# Patient Record
Sex: FEMALE | Race: WHITE | NOT HISPANIC OR LATINO | Employment: OTHER | URBAN - METROPOLITAN AREA
[De-identification: names, ages, dates, MRNs, and addresses within clinical notes are randomized per-mention and may not be internally consistent; named-entity substitution may affect disease eponyms.]

---

## 2017-01-25 ENCOUNTER — ALLSCRIPTS OFFICE VISIT (OUTPATIENT)
Dept: OTHER | Facility: OTHER | Age: 52
End: 2017-01-25

## 2017-01-25 DIAGNOSIS — K80.10 CALCULUS OF GALLBLADDER WITH CHRONIC CHOLECYSTITIS WITHOUT OBSTRUCTION: ICD-10-CM

## 2017-01-25 DIAGNOSIS — E03.9 HYPOTHYROIDISM: ICD-10-CM

## 2017-01-31 RX ORDER — OXYCODONE AND ACETAMINOPHEN 10; 325 MG/1; MG/1
1 TABLET ORAL EVERY 4 HOURS PRN
COMMUNITY
End: 2020-03-25

## 2017-01-31 RX ORDER — BUPROPION HYDROCHLORIDE 300 MG/1
300 TABLET ORAL DAILY
COMMUNITY
End: 2017-07-30

## 2017-01-31 RX ORDER — IBUPROFEN 800 MG/1
800 TABLET ORAL EVERY 6 HOURS PRN
COMMUNITY
End: 2017-07-30

## 2017-01-31 RX ORDER — LISINOPRIL 10 MG/1
10 TABLET ORAL DAILY
COMMUNITY
End: 2018-07-10 | Stop reason: SDUPTHER

## 2017-01-31 RX ORDER — LEVOTHYROXINE SODIUM 0.07 MG/1
75 TABLET ORAL DAILY
COMMUNITY
End: 2018-07-10 | Stop reason: SDUPTHER

## 2017-02-01 ENCOUNTER — ANESTHESIA EVENT (OUTPATIENT)
Dept: PERIOP | Facility: HOSPITAL | Age: 52
End: 2017-02-01
Payer: MEDICARE

## 2017-02-01 ENCOUNTER — ANESTHESIA (OUTPATIENT)
Dept: PERIOP | Facility: HOSPITAL | Age: 52
End: 2017-02-01
Payer: MEDICARE

## 2017-02-01 ENCOUNTER — HOSPITAL ENCOUNTER (OUTPATIENT)
Facility: HOSPITAL | Age: 52
Setting detail: OUTPATIENT SURGERY
Discharge: HOME | End: 2017-02-01
Attending: INTERNAL MEDICINE | Admitting: INTERNAL MEDICINE
Payer: MEDICARE

## 2017-02-01 ENCOUNTER — GENERIC CONVERSION - ENCOUNTER (OUTPATIENT)
Dept: OTHER | Facility: OTHER | Age: 52
End: 2017-02-01

## 2017-02-01 VITALS
TEMPERATURE: 98 F | OXYGEN SATURATION: 98 % | DIASTOLIC BLOOD PRESSURE: 78 MMHG | HEART RATE: 73 BPM | WEIGHT: 190 LBS | RESPIRATION RATE: 18 BRPM | SYSTOLIC BLOOD PRESSURE: 117 MMHG | HEIGHT: 65 IN | BODY MASS INDEX: 31.65 KG/M2

## 2017-02-01 DIAGNOSIS — R10.13 EPIGASTRIC PAIN: ICD-10-CM

## 2017-02-01 DIAGNOSIS — Z12.11 ENCOUNTER FOR SCREENING FOR MALIGNANT NEOPLASM OF COLON: ICD-10-CM

## 2017-02-01 PROCEDURE — 88305 TISSUE EXAM BY PATHOLOGIST: CPT | Performed by: INTERNAL MEDICINE

## 2017-02-01 RX ORDER — LIDOCAINE HYDROCHLORIDE 10 MG/ML
INJECTION, SOLUTION INFILTRATION; PERINEURAL AS NEEDED
Status: DISCONTINUED | OUTPATIENT
Start: 2017-02-01 | End: 2017-02-01 | Stop reason: SURG

## 2017-02-01 RX ORDER — ALPRAZOLAM 1 MG/1
1 TABLET ORAL AS NEEDED
COMMUNITY
End: 2022-02-16

## 2017-02-01 RX ORDER — PROPOFOL 10 MG/ML
INJECTION, EMULSION INTRAVENOUS AS NEEDED
Status: DISCONTINUED | OUTPATIENT
Start: 2017-02-01 | End: 2017-02-01 | Stop reason: SURG

## 2017-02-01 RX ORDER — SODIUM CHLORIDE, SODIUM LACTATE, POTASSIUM CHLORIDE, CALCIUM CHLORIDE 600; 310; 30; 20 MG/100ML; MG/100ML; MG/100ML; MG/100ML
50 INJECTION, SOLUTION INTRAVENOUS CONTINUOUS
Status: DISCONTINUED | OUTPATIENT
Start: 2017-02-01 | End: 2017-02-01 | Stop reason: HOSPADM

## 2017-02-01 RX ADMIN — PROPOFOL 40 MG: 10 INJECTION, EMULSION INTRAVENOUS at 10:25

## 2017-02-01 RX ADMIN — PROPOFOL 150 MG: 10 INJECTION, EMULSION INTRAVENOUS at 10:15

## 2017-02-01 RX ADMIN — SODIUM CHLORIDE, SODIUM LACTATE, POTASSIUM CHLORIDE, AND CALCIUM CHLORIDE: .6; .31; .03; .02 INJECTION, SOLUTION INTRAVENOUS at 10:15

## 2017-02-01 RX ADMIN — LIDOCAINE HYDROCHLORIDE 50 MG: 10 INJECTION, SOLUTION INFILTRATION; PERINEURAL at 10:15

## 2017-02-01 RX ADMIN — PROPOFOL 60 MG: 10 INJECTION, EMULSION INTRAVENOUS at 10:30

## 2017-02-01 RX ADMIN — SODIUM CHLORIDE, SODIUM LACTATE, POTASSIUM CHLORIDE, AND CALCIUM CHLORIDE 50 ML/HR: .6; .31; .03; .02 INJECTION, SOLUTION INTRAVENOUS at 10:01

## 2017-02-01 RX ADMIN — PROPOFOL 50 MG: 10 INJECTION, EMULSION INTRAVENOUS at 10:20

## 2017-02-05 ENCOUNTER — GENERIC CONVERSION - ENCOUNTER (OUTPATIENT)
Dept: OTHER | Facility: OTHER | Age: 52
End: 2017-02-05

## 2017-02-15 ENCOUNTER — HOSPITAL ENCOUNTER (OUTPATIENT)
Dept: ULTRASOUND IMAGING | Facility: HOSPITAL | Age: 52
Discharge: HOME | End: 2017-02-15
Attending: INTERNAL MEDICINE
Payer: MEDICARE

## 2017-02-15 DIAGNOSIS — E03.9 HYPOTHYROIDISM: ICD-10-CM

## 2017-02-15 PROCEDURE — 76705 ECHO EXAM OF ABDOMEN: CPT

## 2017-02-17 ENCOUNTER — GENERIC CONVERSION - ENCOUNTER (OUTPATIENT)
Dept: OTHER | Facility: OTHER | Age: 52
End: 2017-02-17

## 2017-02-21 ENCOUNTER — APPOINTMENT (OUTPATIENT)
Dept: LAB | Facility: HOSPITAL | Age: 52
End: 2017-02-21
Attending: SURGERY
Payer: MEDICARE

## 2017-02-21 ENCOUNTER — TRANSCRIBE ORDERS (OUTPATIENT)
Dept: LAB | Facility: HOSPITAL | Age: 52
End: 2017-02-21

## 2017-02-21 ENCOUNTER — OFFICE VISIT (OUTPATIENT)
Dept: LAB | Facility: HOSPITAL | Age: 52
End: 2017-02-21
Attending: SURGERY
Payer: MEDICARE

## 2017-02-21 ENCOUNTER — ALLSCRIPTS OFFICE VISIT (OUTPATIENT)
Dept: OTHER | Facility: OTHER | Age: 52
End: 2017-02-21

## 2017-02-21 DIAGNOSIS — Z01.818 OTHER SPECIFIED PRE-OPERATIVE EXAMINATION: ICD-10-CM

## 2017-02-21 DIAGNOSIS — R10.13 ABDOMINAL PAIN, EPIGASTRIC: ICD-10-CM

## 2017-02-21 DIAGNOSIS — R10.13 ABDOMINAL PAIN, EPIGASTRIC: Primary | ICD-10-CM

## 2017-02-21 DIAGNOSIS — K80.10 CALCULUS OF GALLBLADDER WITH CHRONIC CHOLECYSTITIS WITHOUT OBSTRUCTION: ICD-10-CM

## 2017-02-21 LAB
ALBUMIN SERPL BCP-MCNC: 3.7 G/DL (ref 3.5–5)
ALP SERPL-CCNC: 100 U/L (ref 46–116)
ALT SERPL W P-5'-P-CCNC: 28 U/L (ref 12–78)
ANION GAP SERPL CALCULATED.3IONS-SCNC: 5 MMOL/L (ref 4–13)
AST SERPL W P-5'-P-CCNC: 18 U/L (ref 5–45)
BASOPHILS # BLD AUTO: 0.05 THOUSANDS/ΜL (ref 0–0.1)
BASOPHILS NFR BLD AUTO: 1 % (ref 0–1)
BILIRUB SERPL-MCNC: 0.6 MG/DL (ref 0.2–1)
BUN SERPL-MCNC: 12 MG/DL (ref 5–25)
CALCIUM SERPL-MCNC: 9.5 MG/DL (ref 8.3–10.1)
CHLORIDE SERPL-SCNC: 107 MMOL/L (ref 100–108)
CO2 SERPL-SCNC: 31 MMOL/L (ref 21–32)
CREAT SERPL-MCNC: 0.91 MG/DL (ref 0.6–1.3)
EOSINOPHIL # BLD AUTO: 0.05 THOUSAND/ΜL (ref 0–0.61)
EOSINOPHIL NFR BLD AUTO: 1 % (ref 0–6)
ERYTHROCYTE [DISTWIDTH] IN BLOOD BY AUTOMATED COUNT: 14 % (ref 11.6–15.1)
GFR SERPL CREATININE-BSD FRML MDRD: >60 ML/MIN/1.73SQ M
GLUCOSE SERPL-MCNC: 88 MG/DL (ref 65–140)
HCT VFR BLD AUTO: 37.7 % (ref 34.8–46.1)
HGB BLD-MCNC: 11.7 G/DL (ref 11.5–15.4)
LYMPHOCYTES # BLD AUTO: 1.59 THOUSANDS/ΜL (ref 0.6–4.47)
LYMPHOCYTES NFR BLD AUTO: 32 % (ref 14–44)
MCH RBC QN AUTO: 26.4 PG (ref 26.8–34.3)
MCHC RBC AUTO-ENTMCNC: 31 G/DL (ref 31.4–37.4)
MCV RBC AUTO: 85 FL (ref 82–98)
MONOCYTES # BLD AUTO: 0.47 THOUSAND/ΜL (ref 0.17–1.22)
MONOCYTES NFR BLD AUTO: 9 % (ref 4–12)
NEUTROPHILS # BLD AUTO: 2.87 THOUSANDS/ΜL (ref 1.85–7.62)
NEUTS SEG NFR BLD AUTO: 57 % (ref 43–75)
NRBC BLD AUTO-RTO: 0 /100 WBCS
PLATELET # BLD AUTO: 205 THOUSANDS/UL (ref 149–390)
PMV BLD AUTO: 9.9 FL (ref 8.9–12.7)
POTASSIUM SERPL-SCNC: 4.3 MMOL/L (ref 3.5–5.3)
PROT SERPL-MCNC: 7.1 G/DL (ref 6.4–8.2)
RBC # BLD AUTO: 4.43 MILLION/UL (ref 3.81–5.12)
SODIUM SERPL-SCNC: 143 MMOL/L (ref 136–145)
WBC # BLD AUTO: 5.04 THOUSAND/UL (ref 4.31–10.16)

## 2017-02-21 PROCEDURE — 85025 COMPLETE CBC W/AUTO DIFF WBC: CPT

## 2017-02-21 PROCEDURE — 36415 COLL VENOUS BLD VENIPUNCTURE: CPT

## 2017-02-21 PROCEDURE — 80053 COMPREHEN METABOLIC PANEL: CPT

## 2017-02-21 PROCEDURE — 93005 ELECTROCARDIOGRAM TRACING: CPT

## 2017-02-22 LAB
ATRIAL RATE: 61 BPM
P AXIS: 32 DEGREES
PR INTERVAL: 144 MS
QRS AXIS: 21 DEGREES
QRSD INTERVAL: 76 MS
QT INTERVAL: 434 MS
QTC INTERVAL: 436 MS
T WAVE AXIS: 28 DEGREES
VENTRICULAR RATE: 61 BPM

## 2017-02-24 ENCOUNTER — ANESTHESIA (OUTPATIENT)
Dept: PERIOP | Facility: HOSPITAL | Age: 52
End: 2017-02-24
Payer: MEDICARE

## 2017-02-24 ENCOUNTER — APPOINTMENT (OUTPATIENT)
Dept: RADIOLOGY | Facility: HOSPITAL | Age: 52
End: 2017-02-24
Payer: MEDICARE

## 2017-02-24 ENCOUNTER — HOSPITAL ENCOUNTER (OUTPATIENT)
Facility: HOSPITAL | Age: 52
Setting detail: OUTPATIENT SURGERY
Discharge: HOME | End: 2017-02-24
Attending: SURGERY | Admitting: SURGERY
Payer: MEDICARE

## 2017-02-24 ENCOUNTER — ANESTHESIA EVENT (OUTPATIENT)
Dept: PERIOP | Facility: HOSPITAL | Age: 52
End: 2017-02-24
Payer: MEDICARE

## 2017-02-24 VITALS
BODY MASS INDEX: 31.65 KG/M2 | DIASTOLIC BLOOD PRESSURE: 70 MMHG | RESPIRATION RATE: 18 BRPM | SYSTOLIC BLOOD PRESSURE: 107 MMHG | HEART RATE: 84 BPM | WEIGHT: 190 LBS | TEMPERATURE: 98.2 F | HEIGHT: 65 IN | OXYGEN SATURATION: 95 %

## 2017-02-24 DIAGNOSIS — K80.10 CALCULUS OF GALLBLADDER WITH CHRONIC CHOLECYSTITIS WITHOUT OBSTRUCTION: ICD-10-CM

## 2017-02-24 PROCEDURE — 88304 TISSUE EXAM BY PATHOLOGIST: CPT | Performed by: SURGERY

## 2017-02-24 RX ORDER — BUPIVACAINE HYDROCHLORIDE 2.5 MG/ML
INJECTION, SOLUTION EPIDURAL; INFILTRATION; INTRACAUDAL AS NEEDED
Status: DISCONTINUED | OUTPATIENT
Start: 2017-02-24 | End: 2017-02-24 | Stop reason: HOSPADM

## 2017-02-24 RX ORDER — PROMETHAZINE HYDROCHLORIDE 25 MG/ML
25 INJECTION, SOLUTION INTRAMUSCULAR; INTRAVENOUS ONCE AS NEEDED
Status: COMPLETED | OUTPATIENT
Start: 2017-02-24 | End: 2017-02-24

## 2017-02-24 RX ORDER — LIDOCAINE HYDROCHLORIDE 10 MG/ML
INJECTION, SOLUTION INFILTRATION; PERINEURAL AS NEEDED
Status: DISCONTINUED | OUTPATIENT
Start: 2017-02-24 | End: 2017-02-24 | Stop reason: SURG

## 2017-02-24 RX ORDER — ONDANSETRON 2 MG/ML
4 INJECTION INTRAMUSCULAR; INTRAVENOUS ONCE
Status: DISCONTINUED | OUTPATIENT
Start: 2017-02-24 | End: 2017-02-24 | Stop reason: HOSPADM

## 2017-02-24 RX ORDER — OXYCODONE HYDROCHLORIDE AND ACETAMINOPHEN 5; 325 MG/1; MG/1
1 TABLET ORAL EVERY 4 HOURS PRN
Qty: 30 TABLET | Refills: 0 | Status: SHIPPED | OUTPATIENT
Start: 2017-02-24 | End: 2017-03-06

## 2017-02-24 RX ORDER — ROCURONIUM BROMIDE 10 MG/ML
INJECTION, SOLUTION INTRAVENOUS AS NEEDED
Status: DISCONTINUED | OUTPATIENT
Start: 2017-02-24 | End: 2017-02-24 | Stop reason: SURG

## 2017-02-24 RX ORDER — OXYCODONE HYDROCHLORIDE AND ACETAMINOPHEN 5; 325 MG/1; MG/1
1 TABLET ORAL EVERY 4 HOURS PRN
Status: DISCONTINUED | OUTPATIENT
Start: 2017-02-24 | End: 2017-02-24 | Stop reason: HOSPADM

## 2017-02-24 RX ORDER — METOCLOPRAMIDE HYDROCHLORIDE 5 MG/ML
INJECTION INTRAMUSCULAR; INTRAVENOUS AS NEEDED
Status: DISCONTINUED | OUTPATIENT
Start: 2017-02-24 | End: 2017-02-24 | Stop reason: SURG

## 2017-02-24 RX ORDER — PROPOFOL 10 MG/ML
INJECTION, EMULSION INTRAVENOUS AS NEEDED
Status: DISCONTINUED | OUTPATIENT
Start: 2017-02-24 | End: 2017-02-24 | Stop reason: SURG

## 2017-02-24 RX ORDER — FENTANYL CITRATE/PF 50 MCG/ML
50 SYRINGE (ML) INJECTION
Status: DISCONTINUED | OUTPATIENT
Start: 2017-02-24 | End: 2017-02-24 | Stop reason: HOSPADM

## 2017-02-24 RX ORDER — MORPHINE SULFATE 2 MG/ML
2 INJECTION, SOLUTION INTRAMUSCULAR; INTRAVENOUS EVERY 2 HOUR PRN
Status: DISCONTINUED | OUTPATIENT
Start: 2017-02-24 | End: 2017-02-24 | Stop reason: HOSPADM

## 2017-02-24 RX ORDER — MEPERIDINE HYDROCHLORIDE 25 MG/ML
25 INJECTION INTRAMUSCULAR; INTRAVENOUS; SUBCUTANEOUS AS NEEDED
Status: DISCONTINUED | OUTPATIENT
Start: 2017-02-24 | End: 2017-02-24 | Stop reason: HOSPADM

## 2017-02-24 RX ORDER — MIDAZOLAM HYDROCHLORIDE 1 MG/ML
INJECTION INTRAMUSCULAR; INTRAVENOUS AS NEEDED
Status: DISCONTINUED | OUTPATIENT
Start: 2017-02-24 | End: 2017-02-24 | Stop reason: SURG

## 2017-02-24 RX ORDER — FENTANYL CITRATE 50 UG/ML
INJECTION, SOLUTION INTRAMUSCULAR; INTRAVENOUS AS NEEDED
Status: DISCONTINUED | OUTPATIENT
Start: 2017-02-24 | End: 2017-02-24 | Stop reason: SURG

## 2017-02-24 RX ORDER — ONDANSETRON 2 MG/ML
INJECTION INTRAMUSCULAR; INTRAVENOUS AS NEEDED
Status: DISCONTINUED | OUTPATIENT
Start: 2017-02-24 | End: 2017-02-24 | Stop reason: SURG

## 2017-02-24 RX ORDER — MAGNESIUM HYDROXIDE 1200 MG/15ML
LIQUID ORAL AS NEEDED
Status: DISCONTINUED | OUTPATIENT
Start: 2017-02-24 | End: 2017-02-24 | Stop reason: HOSPADM

## 2017-02-24 RX ORDER — SODIUM CHLORIDE, SODIUM LACTATE, POTASSIUM CHLORIDE, CALCIUM CHLORIDE 600; 310; 30; 20 MG/100ML; MG/100ML; MG/100ML; MG/100ML
100 INJECTION, SOLUTION INTRAVENOUS CONTINUOUS
Status: DISCONTINUED | OUTPATIENT
Start: 2017-02-24 | End: 2017-02-24 | Stop reason: HOSPADM

## 2017-02-24 RX ORDER — ONDANSETRON 2 MG/ML
4 INJECTION INTRAMUSCULAR; INTRAVENOUS ONCE AS NEEDED
Status: DISCONTINUED | OUTPATIENT
Start: 2017-02-24 | End: 2017-02-24 | Stop reason: HOSPADM

## 2017-02-24 RX ORDER — GLYCOPYRROLATE 0.2 MG/ML
INJECTION INTRAMUSCULAR; INTRAVENOUS AS NEEDED
Status: DISCONTINUED | OUTPATIENT
Start: 2017-02-24 | End: 2017-02-24 | Stop reason: SURG

## 2017-02-24 RX ADMIN — OXYCODONE HYDROCHLORIDE AND ACETAMINOPHEN 1 TABLET: 5; 325 TABLET ORAL at 13:58

## 2017-02-24 RX ADMIN — FENTANYL CITRATE 50 MCG: 50 INJECTION, SOLUTION INTRAMUSCULAR; INTRAVENOUS at 11:41

## 2017-02-24 RX ADMIN — DEXAMETHASONE SODIUM PHOSPHATE 10 MG: 10 INJECTION INTRAMUSCULAR; INTRAVENOUS at 12:01

## 2017-02-24 RX ADMIN — HYDROMORPHONE HYDROCHLORIDE 0.5 MG: 1 INJECTION, SOLUTION INTRAMUSCULAR; INTRAVENOUS; SUBCUTANEOUS at 12:11

## 2017-02-24 RX ADMIN — ROCURONIUM BROMIDE 40 MG: 10 INJECTION, SOLUTION INTRAVENOUS at 11:29

## 2017-02-24 RX ADMIN — LIDOCAINE HYDROCHLORIDE 50 MG: 10 INJECTION, SOLUTION INFILTRATION; PERINEURAL at 11:29

## 2017-02-24 RX ADMIN — METOCLOPRAMIDE HYDROCHLORIDE 10 MG: 5 INJECTION INTRAMUSCULAR; INTRAVENOUS at 12:17

## 2017-02-24 RX ADMIN — SODIUM CHLORIDE, SODIUM LACTATE, POTASSIUM CHLORIDE, AND CALCIUM CHLORIDE 100 ML/HR: .6; .31; .03; .02 INJECTION, SOLUTION INTRAVENOUS at 10:01

## 2017-02-24 RX ADMIN — ONDANSETRON 4 MG: 2 INJECTION INTRAMUSCULAR; INTRAVENOUS at 11:27

## 2017-02-24 RX ADMIN — SODIUM CHLORIDE, SODIUM LACTATE, POTASSIUM CHLORIDE, AND CALCIUM CHLORIDE: .6; .31; .03; .02 INJECTION, SOLUTION INTRAVENOUS at 12:19

## 2017-02-24 RX ADMIN — GLYCOPYRROLATE 0.1 MG: 0.2 INJECTION, SOLUTION INTRAMUSCULAR; INTRAVENOUS at 11:27

## 2017-02-24 RX ADMIN — MIDAZOLAM HYDROCHLORIDE 2 MG: 1 INJECTION, SOLUTION INTRAMUSCULAR; INTRAVENOUS at 11:20

## 2017-02-24 RX ADMIN — NEOSTIGMINE METHYLSULFATE 4 MG: 1 INJECTION INTRAMUSCULAR; INTRAVENOUS; SUBCUTANEOUS at 12:09

## 2017-02-24 RX ADMIN — PROPOFOL 200 MG: 10 INJECTION, EMULSION INTRAVENOUS at 11:29

## 2017-02-24 RX ADMIN — PROMETHAZINE HYDROCHLORIDE 25 MG: 25 INJECTION INTRAMUSCULAR; INTRAVENOUS at 12:47

## 2017-02-24 RX ADMIN — FENTANYL CITRATE 50 MCG: 50 INJECTION, SOLUTION INTRAMUSCULAR; INTRAVENOUS at 11:29

## 2017-02-24 RX ADMIN — FENTANYL CITRATE 50 MCG: 50 INJECTION INTRAMUSCULAR; INTRAVENOUS at 12:45

## 2017-02-24 RX ADMIN — CEFAZOLIN SODIUM 2000 MG: 2 SOLUTION INTRAVENOUS at 11:35

## 2017-02-24 RX ADMIN — GLYCOPYRROLATE 0.4 MG: 0.2 INJECTION, SOLUTION INTRAMUSCULAR; INTRAVENOUS at 12:09

## 2017-02-24 RX ADMIN — FENTANYL CITRATE 50 MCG: 50 INJECTION INTRAMUSCULAR; INTRAVENOUS at 12:35

## 2017-03-07 ENCOUNTER — ALLSCRIPTS OFFICE VISIT (OUTPATIENT)
Dept: OTHER | Facility: OTHER | Age: 52
End: 2017-03-07

## 2017-07-30 ENCOUNTER — APPOINTMENT (EMERGENCY)
Dept: CT IMAGING | Facility: HOSPITAL | Age: 52
DRG: 438 | End: 2017-07-30
Payer: COMMERCIAL

## 2017-07-30 ENCOUNTER — APPOINTMENT (EMERGENCY)
Dept: RADIOLOGY | Facility: HOSPITAL | Age: 52
DRG: 438 | End: 2017-07-30
Payer: COMMERCIAL

## 2017-07-30 ENCOUNTER — HOSPITAL ENCOUNTER (INPATIENT)
Facility: HOSPITAL | Age: 52
LOS: 1 days | Discharge: HOME | DRG: 438 | End: 2017-08-01
Attending: EMERGENCY MEDICINE | Admitting: INTERNAL MEDICINE
Payer: COMMERCIAL

## 2017-07-30 DIAGNOSIS — R10.9 ABDOMINAL PAIN: ICD-10-CM

## 2017-07-30 DIAGNOSIS — K85.90 PANCREATITIS: Primary | ICD-10-CM

## 2017-07-30 PROBLEM — R07.9 CHEST PAIN: Status: ACTIVE | Noted: 2017-07-30

## 2017-07-30 PROBLEM — R07.1 CHEST PAIN ON BREATHING: Status: ACTIVE | Noted: 2017-07-30

## 2017-07-30 PROBLEM — Z72.0 TOBACCO USE: Status: ACTIVE | Noted: 2017-07-30

## 2017-07-30 PROBLEM — E03.9 HYPOTHYROIDISM: Status: ACTIVE | Noted: 2017-07-30

## 2017-07-30 LAB
ALBUMIN SERPL BCP-MCNC: 3.9 G/DL (ref 3.5–5)
ALP SERPL-CCNC: 101 U/L (ref 46–116)
ALT SERPL W P-5'-P-CCNC: 39 U/L (ref 12–78)
ANION GAP SERPL CALCULATED.3IONS-SCNC: 8 MMOL/L (ref 4–13)
AST SERPL W P-5'-P-CCNC: 29 U/L (ref 5–45)
BACTERIA UR QL AUTO: ABNORMAL /HPF
BASOPHILS # BLD AUTO: 0.06 THOUSANDS/ΜL (ref 0–0.1)
BASOPHILS NFR BLD AUTO: 1 % (ref 0–1)
BILIRUB SERPL-MCNC: 1 MG/DL (ref 0.2–1)
BILIRUB UR QL STRIP: NEGATIVE
BUN SERPL-MCNC: 17 MG/DL (ref 5–25)
CALCIUM SERPL-MCNC: 9.3 MG/DL (ref 8.3–10.1)
CHLORIDE SERPL-SCNC: 105 MMOL/L (ref 100–108)
CLARITY UR: CLEAR
CO2 SERPL-SCNC: 28 MMOL/L (ref 21–32)
COLOR UR: YELLOW
CREAT SERPL-MCNC: 0.97 MG/DL (ref 0.6–1.3)
EOSINOPHIL # BLD AUTO: 0.04 THOUSAND/ΜL (ref 0–0.61)
EOSINOPHIL NFR BLD AUTO: 1 % (ref 0–6)
ERYTHROCYTE [DISTWIDTH] IN BLOOD BY AUTOMATED COUNT: 14.3 % (ref 11.6–15.1)
GFR SERPL CREATININE-BSD FRML MDRD: 68 ML/MIN/1.73SQ M
GLUCOSE SERPL-MCNC: 125 MG/DL (ref 65–140)
GLUCOSE UR STRIP-MCNC: NEGATIVE MG/DL
HCG UR QL: NEGATIVE
HCT VFR BLD AUTO: 37.7 % (ref 34.8–46.1)
HGB BLD-MCNC: 11.6 G/DL (ref 11.5–15.4)
HGB UR QL STRIP.AUTO: ABNORMAL
KETONES UR STRIP-MCNC: ABNORMAL MG/DL
LEUKOCYTE ESTERASE UR QL STRIP: NEGATIVE
LIPASE SERPL-CCNC: 1338 U/L (ref 73–393)
LYMPHOCYTES # BLD AUTO: 1.02 THOUSANDS/ΜL (ref 0.6–4.47)
LYMPHOCYTES NFR BLD AUTO: 17 % (ref 14–44)
MCH RBC QN AUTO: 25.4 PG (ref 26.8–34.3)
MCHC RBC AUTO-ENTMCNC: 30.8 G/DL (ref 31.4–37.4)
MCV RBC AUTO: 83 FL (ref 82–98)
MONOCYTES # BLD AUTO: 0.34 THOUSAND/ΜL (ref 0.17–1.22)
MONOCYTES NFR BLD AUTO: 6 % (ref 4–12)
NEUTROPHILS # BLD AUTO: 4.71 THOUSANDS/ΜL (ref 1.85–7.62)
NEUTS SEG NFR BLD AUTO: 76 % (ref 43–75)
NITRITE UR QL STRIP: NEGATIVE
NON-SQ EPI CELLS URNS QL MICRO: ABNORMAL /HPF
NRBC BLD AUTO-RTO: 0 /100 WBCS
PH UR STRIP.AUTO: 5.5 [PH] (ref 4.5–8)
PLATELET # BLD AUTO: 262 THOUSANDS/UL (ref 149–390)
PMV BLD AUTO: 10.3 FL (ref 8.9–12.7)
POTASSIUM SERPL-SCNC: 3.9 MMOL/L (ref 3.5–5.3)
PROT SERPL-MCNC: 6.8 G/DL (ref 6.4–8.2)
PROT UR STRIP-MCNC: NEGATIVE MG/DL
RBC # BLD AUTO: 4.57 MILLION/UL (ref 3.81–5.12)
RBC #/AREA URNS AUTO: ABNORMAL /HPF
SODIUM SERPL-SCNC: 141 MMOL/L (ref 136–145)
SP GR UR STRIP.AUTO: 1.01 (ref 1–1.03)
TROPONIN I SERPL-MCNC: <0.02 NG/ML
UROBILINOGEN UR QL STRIP.AUTO: 0.2 E.U./DL
WBC # BLD AUTO: 6.18 THOUSAND/UL (ref 4.31–10.16)
WBC #/AREA URNS AUTO: ABNORMAL /HPF

## 2017-07-30 PROCEDURE — 81001 URINALYSIS AUTO W/SCOPE: CPT | Performed by: EMERGENCY MEDICINE

## 2017-07-30 PROCEDURE — 83690 ASSAY OF LIPASE: CPT | Performed by: EMERGENCY MEDICINE

## 2017-07-30 PROCEDURE — 93005 ELECTROCARDIOGRAM TRACING: CPT | Performed by: EMERGENCY MEDICINE

## 2017-07-30 PROCEDURE — 96374 THER/PROPH/DIAG INJ IV PUSH: CPT

## 2017-07-30 PROCEDURE — 84484 ASSAY OF TROPONIN QUANT: CPT | Performed by: EMERGENCY MEDICINE

## 2017-07-30 PROCEDURE — 96375 TX/PRO/DX INJ NEW DRUG ADDON: CPT

## 2017-07-30 PROCEDURE — 74177 CT ABD & PELVIS W/CONTRAST: CPT

## 2017-07-30 PROCEDURE — 80053 COMPREHEN METABOLIC PANEL: CPT | Performed by: EMERGENCY MEDICINE

## 2017-07-30 PROCEDURE — 99285 EMERGENCY DEPT VISIT HI MDM: CPT

## 2017-07-30 PROCEDURE — 81025 URINE PREGNANCY TEST: CPT | Performed by: EMERGENCY MEDICINE

## 2017-07-30 PROCEDURE — 71020 HB CHEST X-RAY 2VW FRONTAL&LATL: CPT

## 2017-07-30 PROCEDURE — 36415 COLL VENOUS BLD VENIPUNCTURE: CPT | Performed by: EMERGENCY MEDICINE

## 2017-07-30 PROCEDURE — 85025 COMPLETE CBC W/AUTO DIFF WBC: CPT | Performed by: EMERGENCY MEDICINE

## 2017-07-30 PROCEDURE — 96361 HYDRATE IV INFUSION ADD-ON: CPT

## 2017-07-30 RX ORDER — SODIUM CHLORIDE 9 MG/ML
125 INJECTION, SOLUTION INTRAVENOUS CONTINUOUS
Status: DISCONTINUED | OUTPATIENT
Start: 2017-07-30 | End: 2017-08-01

## 2017-07-30 RX ORDER — KETOROLAC TROMETHAMINE 30 MG/ML
15 INJECTION, SOLUTION INTRAMUSCULAR; INTRAVENOUS ONCE
Status: COMPLETED | OUTPATIENT
Start: 2017-07-30 | End: 2017-07-30

## 2017-07-30 RX ORDER — ALPRAZOLAM 0.5 MG/1
2 TABLET ORAL AS NEEDED
Status: DISCONTINUED | OUTPATIENT
Start: 2017-07-30 | End: 2017-08-01

## 2017-07-30 RX ORDER — NAPROXEN 500 MG/1
500 TABLET ORAL 2 TIMES DAILY WITH MEALS
COMMUNITY
End: 2017-08-01 | Stop reason: HOSPADM

## 2017-07-30 RX ORDER — CALCIUM CARBONATE 200(500)MG
1000 TABLET,CHEWABLE ORAL DAILY PRN
Status: DISCONTINUED | OUTPATIENT
Start: 2017-07-30 | End: 2017-08-01 | Stop reason: HOSPADM

## 2017-07-30 RX ORDER — LORAZEPAM 2 MG/ML
0.5 INJECTION INTRAMUSCULAR ONCE
Status: COMPLETED | OUTPATIENT
Start: 2017-07-30 | End: 2017-07-30

## 2017-07-30 RX ORDER — DOXYCYCLINE HYCLATE 100 MG/1
100 CAPSULE ORAL EVERY 12 HOURS SCHEDULED
Status: DISCONTINUED | OUTPATIENT
Start: 2017-07-30 | End: 2017-08-01

## 2017-07-30 RX ORDER — LEVOTHYROXINE SODIUM 0.07 MG/1
75 TABLET ORAL DAILY
Status: DISCONTINUED | OUTPATIENT
Start: 2017-07-31 | End: 2017-08-01 | Stop reason: HOSPADM

## 2017-07-30 RX ORDER — LISINOPRIL 10 MG/1
10 TABLET ORAL DAILY
Status: DISCONTINUED | OUTPATIENT
Start: 2017-07-31 | End: 2017-07-31

## 2017-07-30 RX ORDER — ONDANSETRON 2 MG/ML
4 INJECTION INTRAMUSCULAR; INTRAVENOUS ONCE
Status: COMPLETED | OUTPATIENT
Start: 2017-07-30 | End: 2017-07-30

## 2017-07-30 RX ORDER — DOXYCYCLINE HYCLATE 200 MG/1
100 TABLET, DELAYED RELEASE ORAL
COMMUNITY
End: 2017-08-01 | Stop reason: HOSPADM

## 2017-07-30 RX ORDER — OXYCODONE HYDROCHLORIDE AND ACETAMINOPHEN 5; 325 MG/1; MG/1
1 TABLET ORAL EVERY 4 HOURS PRN
Status: DISCONTINUED | OUTPATIENT
Start: 2017-07-30 | End: 2017-08-01 | Stop reason: HOSPADM

## 2017-07-30 RX ORDER — ONDANSETRON 2 MG/ML
4 INJECTION INTRAMUSCULAR; INTRAVENOUS EVERY 6 HOURS PRN
Status: DISCONTINUED | OUTPATIENT
Start: 2017-07-30 | End: 2017-08-01 | Stop reason: HOSPADM

## 2017-07-30 RX ADMIN — ONDANSETRON 4 MG: 2 INJECTION INTRAMUSCULAR; INTRAVENOUS at 16:32

## 2017-07-30 RX ADMIN — SODIUM CHLORIDE 1000 ML: 0.9 INJECTION, SOLUTION INTRAVENOUS at 16:33

## 2017-07-30 RX ADMIN — DOXYCYCLINE HYCLATE 100 MG: 100 CAPSULE, GELATIN COATED ORAL at 21:48

## 2017-07-30 RX ADMIN — KETOROLAC TROMETHAMINE 15 MG: 30 INJECTION, SOLUTION INTRAMUSCULAR at 16:32

## 2017-07-30 RX ADMIN — HYDROMORPHONE HYDROCHLORIDE 1 MG: 1 INJECTION, SOLUTION INTRAMUSCULAR; INTRAVENOUS; SUBCUTANEOUS at 17:08

## 2017-07-30 RX ADMIN — HYDROMORPHONE HYDROCHLORIDE 1 MG: 1 INJECTION, SOLUTION INTRAMUSCULAR; INTRAVENOUS; SUBCUTANEOUS at 19:14

## 2017-07-30 RX ADMIN — SODIUM CHLORIDE 125 ML/HR: 0.9 INJECTION, SOLUTION INTRAVENOUS at 21:46

## 2017-07-30 RX ADMIN — IOHEXOL 100 ML: 350 INJECTION, SOLUTION INTRAVENOUS at 17:03

## 2017-07-30 RX ADMIN — LORAZEPAM 0.5 MG: 2 INJECTION INTRAMUSCULAR; INTRAVENOUS at 16:32

## 2017-07-30 RX ADMIN — HYDROMORPHONE HYDROCHLORIDE 1 MG: 1 INJECTION, SOLUTION INTRAMUSCULAR; INTRAVENOUS; SUBCUTANEOUS at 23:52

## 2017-07-30 RX ADMIN — SODIUM CHLORIDE 1000 ML: 0.9 INJECTION, SOLUTION INTRAVENOUS at 18:37

## 2017-07-31 ENCOUNTER — APPOINTMENT (INPATIENT)
Dept: ULTRASOUND IMAGING | Facility: HOSPITAL | Age: 52
DRG: 438 | End: 2017-07-31
Payer: COMMERCIAL

## 2017-07-31 LAB
ANION GAP SERPL CALCULATED.3IONS-SCNC: 6 MMOL/L (ref 4–13)
APTT PPP: 27 SECONDS (ref 23–35)
ATRIAL RATE: 60 BPM
BUN SERPL-MCNC: 16 MG/DL (ref 5–25)
C DIFF TOX GENS STL QL NAA+PROBE: NORMAL
CALCIUM SERPL-MCNC: 7.8 MG/DL (ref 8.3–10.1)
CHLORIDE SERPL-SCNC: 112 MMOL/L (ref 100–108)
CHOLEST SERPL-MCNC: 131 MG/DL (ref 50–200)
CO2 SERPL-SCNC: 25 MMOL/L (ref 21–32)
CREAT SERPL-MCNC: 0.74 MG/DL (ref 0.6–1.3)
ERYTHROCYTE [DISTWIDTH] IN BLOOD BY AUTOMATED COUNT: 14.6 % (ref 11.6–15.1)
GFR SERPL CREATININE-BSD FRML MDRD: 94 ML/MIN/1.73SQ M
GLUCOSE SERPL-MCNC: 105 MG/DL (ref 65–140)
HCT VFR BLD AUTO: 29.8 % (ref 34.8–46.1)
HDLC SERPL-MCNC: 46 MG/DL (ref 40–60)
HGB BLD-MCNC: 9.1 G/DL (ref 11.5–15.4)
INR PPP: 1.22 (ref 0.86–1.16)
LDLC SERPL CALC-MCNC: 72 MG/DL (ref 0–100)
MAGNESIUM SERPL-MCNC: 1.6 MG/DL (ref 1.6–2.6)
MCH RBC QN AUTO: 25.4 PG (ref 26.8–34.3)
MCHC RBC AUTO-ENTMCNC: 30.5 G/DL (ref 31.4–37.4)
MCV RBC AUTO: 83 FL (ref 82–98)
P AXIS: 46 DEGREES
PLATELET # BLD AUTO: 172 THOUSANDS/UL (ref 149–390)
PLATELET # BLD AUTO: 292 THOUSANDS/UL (ref 149–390)
PMV BLD AUTO: 10.6 FL (ref 8.9–12.7)
PMV BLD AUTO: 10.7 FL (ref 8.9–12.7)
POTASSIUM SERPL-SCNC: 3.5 MMOL/L (ref 3.5–5.3)
PR INTERVAL: 142 MS
PROTHROMBIN TIME: 15.7 SECONDS (ref 12.1–14.4)
QRS AXIS: 44 DEGREES
QRSD INTERVAL: 74 MS
QT INTERVAL: 458 MS
QTC INTERVAL: 458 MS
RBC # BLD AUTO: 3.58 MILLION/UL (ref 3.81–5.12)
SODIUM SERPL-SCNC: 143 MMOL/L (ref 136–145)
T WAVE AXIS: 49 DEGREES
TRIGL SERPL-MCNC: 63 MG/DL
TROPONIN I SERPL-MCNC: <0.02 NG/ML
TROPONIN I SERPL-MCNC: <0.02 NG/ML
TSH SERPL DL<=0.05 MIU/L-ACNC: 2.32 UIU/ML (ref 0.36–3.74)
VENTRICULAR RATE: 60 BPM
WBC # BLD AUTO: 4.66 THOUSAND/UL (ref 4.31–10.16)

## 2017-07-31 PROCEDURE — 85027 COMPLETE CBC AUTOMATED: CPT | Performed by: PHYSICIAN ASSISTANT

## 2017-07-31 PROCEDURE — 80061 LIPID PANEL: CPT | Performed by: PHYSICIAN ASSISTANT

## 2017-07-31 PROCEDURE — 80048 BASIC METABOLIC PNL TOTAL CA: CPT | Performed by: PHYSICIAN ASSISTANT

## 2017-07-31 PROCEDURE — 76705 ECHO EXAM OF ABDOMEN: CPT

## 2017-07-31 PROCEDURE — 83735 ASSAY OF MAGNESIUM: CPT | Performed by: PHYSICIAN ASSISTANT

## 2017-07-31 PROCEDURE — 85610 PROTHROMBIN TIME: CPT | Performed by: PHYSICIAN ASSISTANT

## 2017-07-31 PROCEDURE — 87493 C DIFF AMPLIFIED PROBE: CPT | Performed by: FAMILY MEDICINE

## 2017-07-31 PROCEDURE — 84443 ASSAY THYROID STIM HORMONE: CPT | Performed by: PHYSICIAN ASSISTANT

## 2017-07-31 PROCEDURE — 84484 ASSAY OF TROPONIN QUANT: CPT | Performed by: PHYSICIAN ASSISTANT

## 2017-07-31 PROCEDURE — C9113 INJ PANTOPRAZOLE SODIUM, VIA: HCPCS | Performed by: INTERNAL MEDICINE

## 2017-07-31 PROCEDURE — 85730 THROMBOPLASTIN TIME PARTIAL: CPT | Performed by: PHYSICIAN ASSISTANT

## 2017-07-31 PROCEDURE — 85049 AUTOMATED PLATELET COUNT: CPT | Performed by: PHYSICIAN ASSISTANT

## 2017-07-31 RX ORDER — PANTOPRAZOLE SODIUM 40 MG/1
40 INJECTION, POWDER, FOR SOLUTION INTRAVENOUS
Status: DISCONTINUED | OUTPATIENT
Start: 2017-07-31 | End: 2017-08-01 | Stop reason: HOSPADM

## 2017-07-31 RX ORDER — LIDOCAINE 50 MG/G
1 PATCH TOPICAL DAILY
Status: DISCONTINUED | OUTPATIENT
Start: 2017-07-31 | End: 2017-08-01 | Stop reason: HOSPADM

## 2017-07-31 RX ADMIN — DOXYCYCLINE HYCLATE 100 MG: 100 CAPSULE, GELATIN COATED ORAL at 08:23

## 2017-07-31 RX ADMIN — ALPRAZOLAM 2 MG: 0.5 TABLET ORAL at 15:59

## 2017-07-31 RX ADMIN — OXYCODONE HYDROCHLORIDE AND ACETAMINOPHEN 1 TABLET: 5; 325 TABLET ORAL at 16:16

## 2017-07-31 RX ADMIN — HYDROMORPHONE HYDROCHLORIDE 1 MG: 1 INJECTION, SOLUTION INTRAMUSCULAR; INTRAVENOUS; SUBCUTANEOUS at 14:57

## 2017-07-31 RX ADMIN — LIDOCAINE 1 PATCH: 50 PATCH CUTANEOUS at 11:21

## 2017-07-31 RX ADMIN — ENOXAPARIN SODIUM 40 MG: 40 INJECTION SUBCUTANEOUS at 08:22

## 2017-07-31 RX ADMIN — HYDROMORPHONE HYDROCHLORIDE 1 MG: 1 INJECTION, SOLUTION INTRAMUSCULAR; INTRAVENOUS; SUBCUTANEOUS at 11:25

## 2017-07-31 RX ADMIN — ALPRAZOLAM 2 MG: 0.5 TABLET ORAL at 01:08

## 2017-07-31 RX ADMIN — SERTRALINE HYDROCHLORIDE 50 MG: 50 TABLET ORAL at 08:23

## 2017-07-31 RX ADMIN — SODIUM CHLORIDE 125 ML/HR: 0.9 INJECTION, SOLUTION INTRAVENOUS at 12:37

## 2017-07-31 RX ADMIN — PANTOPRAZOLE SODIUM 40 MG: 40 INJECTION, POWDER, FOR SOLUTION INTRAVENOUS at 21:03

## 2017-07-31 RX ADMIN — DOXYCYCLINE HYCLATE 100 MG: 100 CAPSULE, GELATIN COATED ORAL at 21:03

## 2017-07-31 RX ADMIN — SODIUM CHLORIDE 500 ML: 0.9 INJECTION, SOLUTION INTRAVENOUS at 03:30

## 2017-07-31 RX ADMIN — OXYCODONE HYDROCHLORIDE AND ACETAMINOPHEN 1 TABLET: 5; 325 TABLET ORAL at 08:22

## 2017-08-01 VITALS
SYSTOLIC BLOOD PRESSURE: 127 MMHG | TEMPERATURE: 98.8 F | RESPIRATION RATE: 18 BRPM | WEIGHT: 200 LBS | OXYGEN SATURATION: 97 % | BODY MASS INDEX: 33.28 KG/M2 | HEART RATE: 80 BPM | DIASTOLIC BLOOD PRESSURE: 71 MMHG

## 2017-08-01 LAB
ALBUMIN SERPL BCP-MCNC: 2.8 G/DL (ref 3.5–5)
ALP SERPL-CCNC: 66 U/L (ref 46–116)
ALT SERPL W P-5'-P-CCNC: 27 U/L (ref 12–78)
ANION GAP SERPL CALCULATED.3IONS-SCNC: 8 MMOL/L (ref 4–13)
AST SERPL W P-5'-P-CCNC: 24 U/L (ref 5–45)
BILIRUB SERPL-MCNC: 1.2 MG/DL (ref 0.2–1)
BUN SERPL-MCNC: 9 MG/DL (ref 5–25)
CALCIUM SERPL-MCNC: 8.4 MG/DL (ref 8.3–10.1)
CHLORIDE SERPL-SCNC: 111 MMOL/L (ref 100–108)
CO2 SERPL-SCNC: 25 MMOL/L (ref 21–32)
CREAT SERPL-MCNC: 0.81 MG/DL (ref 0.6–1.3)
ERYTHROCYTE [DISTWIDTH] IN BLOOD BY AUTOMATED COUNT: 14.3 % (ref 11.6–15.1)
GFR SERPL CREATININE-BSD FRML MDRD: 84 ML/MIN/1.73SQ M
GLUCOSE SERPL-MCNC: 111 MG/DL (ref 65–140)
HCT VFR BLD AUTO: 29.2 % (ref 34.8–46.1)
HGB BLD-MCNC: 8.9 G/DL (ref 11.5–15.4)
LIPASE SERPL-CCNC: 297 U/L (ref 73–393)
MCH RBC QN AUTO: 25.6 PG (ref 26.8–34.3)
MCHC RBC AUTO-ENTMCNC: 30.5 G/DL (ref 31.4–37.4)
MCV RBC AUTO: 84 FL (ref 82–98)
PLATELET # BLD AUTO: 167 THOUSANDS/UL (ref 149–390)
PMV BLD AUTO: 10 FL (ref 8.9–12.7)
POTASSIUM SERPL-SCNC: 3.6 MMOL/L (ref 3.5–5.3)
PROT SERPL-MCNC: 5.4 G/DL (ref 6.4–8.2)
RBC # BLD AUTO: 3.48 MILLION/UL (ref 3.81–5.12)
SODIUM SERPL-SCNC: 144 MMOL/L (ref 136–145)
WBC # BLD AUTO: 4.06 THOUSAND/UL (ref 4.31–10.16)

## 2017-08-01 PROCEDURE — C9113 INJ PANTOPRAZOLE SODIUM, VIA: HCPCS | Performed by: INTERNAL MEDICINE

## 2017-08-01 PROCEDURE — 85027 COMPLETE CBC AUTOMATED: CPT | Performed by: INTERNAL MEDICINE

## 2017-08-01 PROCEDURE — 80053 COMPREHEN METABOLIC PANEL: CPT | Performed by: INTERNAL MEDICINE

## 2017-08-01 PROCEDURE — 83690 ASSAY OF LIPASE: CPT | Performed by: INTERNAL MEDICINE

## 2017-08-01 RX ORDER — PANTOPRAZOLE SODIUM 40 MG/1
40 TABLET, DELAYED RELEASE ORAL DAILY
Qty: 30 TABLET | Refills: 2 | Status: SHIPPED | OUTPATIENT
Start: 2017-08-01 | End: 2018-09-19 | Stop reason: CLARIF

## 2017-08-01 RX ORDER — POTASSIUM CHLORIDE AND SODIUM CHLORIDE 900; 300 MG/100ML; MG/100ML
100 INJECTION, SOLUTION INTRAVENOUS CONTINUOUS
Status: DISCONTINUED | OUTPATIENT
Start: 2017-08-01 | End: 2017-08-01 | Stop reason: HOSPADM

## 2017-08-01 RX ORDER — ALPRAZOLAM 0.5 MG/1
2 TABLET ORAL 2 TIMES DAILY PRN
Status: DISCONTINUED | OUTPATIENT
Start: 2017-08-01 | End: 2017-08-01 | Stop reason: HOSPADM

## 2017-08-01 RX ADMIN — SERTRALINE HYDROCHLORIDE 50 MG: 50 TABLET ORAL at 08:17

## 2017-08-01 RX ADMIN — OXYCODONE HYDROCHLORIDE AND ACETAMINOPHEN 1 TABLET: 5; 325 TABLET ORAL at 00:24

## 2017-08-01 RX ADMIN — POTASSIUM CHLORIDE AND SODIUM CHLORIDE 100 ML/HR: 900; 300 INJECTION, SOLUTION INTRAVENOUS at 11:38

## 2017-08-01 RX ADMIN — PANTOPRAZOLE SODIUM 40 MG: 40 INJECTION, POWDER, FOR SOLUTION INTRAVENOUS at 08:17

## 2017-08-01 RX ADMIN — LIDOCAINE 1 PATCH: 50 PATCH CUTANEOUS at 08:21

## 2017-08-01 RX ADMIN — ENOXAPARIN SODIUM 40 MG: 40 INJECTION SUBCUTANEOUS at 08:17

## 2017-08-01 RX ADMIN — OXYCODONE HYDROCHLORIDE AND ACETAMINOPHEN 1 TABLET: 5; 325 TABLET ORAL at 16:40

## 2017-08-01 RX ADMIN — ALPRAZOLAM 2 MG: 0.5 TABLET ORAL at 00:23

## 2017-08-01 RX ADMIN — OXYCODONE HYDROCHLORIDE AND ACETAMINOPHEN 1 TABLET: 5; 325 TABLET ORAL at 08:17

## 2017-08-01 RX ADMIN — DOXYCYCLINE HYCLATE 100 MG: 100 CAPSULE, GELATIN COATED ORAL at 08:17

## 2017-08-01 RX ADMIN — ALPRAZOLAM 2 MG: 0.5 TABLET ORAL at 13:23

## 2017-08-01 RX ADMIN — LEVOTHYROXINE SODIUM 75 MCG: 75 TABLET ORAL at 06:23

## 2018-01-09 NOTE — PROGRESS NOTES
Assessment    1  Postoperative state (V45 89) (Z98 890)    Plan  Postoperative state    · Follow-up PRN Evaluation and Treatment  Follow-up  Status: Complete  Done:  [id number] 08:59PM   Ordered; For: Postoperative state; Ordered By: Donte Franklin Performed:  Due: [id number]    Discussion/Summary    The patient did well after laparoscopic cholecystectomy  She is discharge of my care  Self Referrals: No      Chief Complaint  Pt here for post op of lap lauro      Post-Op  HPI: I had the pleasure of seeing Love Curran, "Swapna Hernández", in the office today for follow up after laparoscopic cholecystectomy for symptomatic gallstones  She offers no complains at this time  She denies any fever, chill, nausea, vomiting, diarrhea constipation, change in the color of the urine or abdominal pain  Active Problems    1  Abdominal pain, epigastric (789 06) (R10 13)   2  Adult hypothyroidism (244 9) (E03 9)   3  Arthritis (716 90) (M19 90)   4  Chronic calculous cholecystitis (574 10) (K80 10)   5  HTN (hypertension), benign (401 1) (I10)   6  Screening for colon cancer (V76 51) (Z12 11)    Social History    · Former smoker (V15 82) (Q46 593)    Current Meds   1  Ibuprofen 800 MG Oral Tablet; Take 1 tablet twice daily as needed Recorded   2  Levothyroxine Sodium 75 MCG Oral Tablet; Take 1 tablet daily Recorded   3  Lisinopril 10 MG Oral Tablet; Take 1 tablet daily Recorded   4  Oxycodone-Acetaminophen  MG Oral Tablet; TAKE 1 TABLET every 4 hours as   needed for pain Recorded   5  Wellbutrin  MG Oral Tablet Extended Release 24 Hour; Take 1 tablet daily   Recorded    Allergies    1  No Known Drug Allergies    Vitals   Recorded: [id number] 11:57AM   Temperature 37 4 F, Oral   Systolic 414, LUE, Sitting   Diastolic 80, LUE, Sitting   Height 5 ft 5 in   Weight 192 lb    BMI Calculated 31 95   BSA Calculated 1 94     Physical Exam    Abdomen The abdomen is soft, non-tender, non-distended, incisions are healing well  Signatures   Electronically signed by : Lois Snider MD; Mar  8 2017  9:00PM EST                       (Author)

## 2018-01-10 NOTE — RESULT NOTES
Verified Results  US ABDOMEN LIMITED [id number] 11:12AM Marilyn Judge Order Number: [id number]    - Patient Instructions: To schedule this appointment, please contact Central Scheduling at [phone]  Test Name Result Flag Reference   US ABDOMEN LIMITED (Report)     RIGHT UPPER QUADRANT ULTRASOUND     INDICATION: Epigastric pain for few weeks   COMPARISON: None  TECHNIQUE:  Real-time ultrasound of the right upper quadrant was performed with a curvilinear transducer with both volumetric sweeps and still imaging techniques  FINDINGS:     PANCREAS: Visualized portions of the pancreas are within normal limits  AORTA AND IVC: Visualized portions are normal for patient age  LIVER:   Size: Within normal range  The liver measures 11 7 cm in the midclavicular line  Contour: Surface contour is smooth  Parenchyma: Echogenicity and echotexture are within normal limits  No evidence of suspicious mass  Limited imaging of the main portal vein shows it to be patent and hepatopedal  Prominent vascularity is noted in the region of the hepatic hilum  There is no associated the recanalized umbilicus vein noted      BILIARY:   The gallbladder is normal in caliber  No wall thickening or pericholecystic fluid  An echogenic focus with distal shadowing is seen within the gallbladder   No sonographic Sarmiento's sign  No intrahepatic biliary dilatation  CBD measures 5 4 mm  No choledocholithiasis  KIDNEY:    Right kidney measures 10 2 x 5 0 cm  Within normal limits  ASCITES:  None  IMPRESSION:   Cholelithiasis  No evidence of biliary dilation  Prominent vascularity noted in the region of the hepatic hilum  , History of portal vein thrombosis has been provided by our technologist  These can BE definitely characterized with the multiphasic CT, May BE sequela of recanalized portal vein         ##sigslh##sigslh   ##fuslh3##fuslh3       Workstation performed: [id number] Signed by:   Yoon Brumfield MD   2/16/17

## 2018-01-13 VITALS
WEIGHT: 190 LBS | DIASTOLIC BLOOD PRESSURE: 80 MMHG | HEIGHT: 65 IN | TEMPERATURE: 98.3 F | BODY MASS INDEX: 31.65 KG/M2 | RESPIRATION RATE: 14 BRPM | SYSTOLIC BLOOD PRESSURE: 128 MMHG | HEART RATE: 74 BPM

## 2018-01-14 VITALS — DIASTOLIC BLOOD PRESSURE: 80 MMHG | HEIGHT: 65 IN | HEART RATE: 72 BPM | SYSTOLIC BLOOD PRESSURE: 140 MMHG

## 2018-01-14 NOTE — RESULT NOTES
Verified Results  (1) TISSUE EXAM [id number] 10:22AM Marcella Black     Test Name Result Flag Reference   LAB AP CASE REPORT (Report)     Surgical Pathology Report             Case: [id number]                   Authorizing Provider: Genie Salazar MD  Collected:      02/01/2017 1022        Ordering Location:   [facility] Received:      02/01/2017 1512                    Operating Room                                 Pathologist:      Mar Napier MD                              Specimens:  A) - Jejunum, Cold bx jejunum                                     B) - Polyp, Colorectal, Cold bx hepatic flexure   LAB AP FINAL DIAGNOSIS (Report)     A  Jejunum:  - Benign, unremarkable small intestinal mucosa  - No villous atrophy, no intraepithelial lymphocytosis or crypt   hyperplasia to suggest malabsorptive enteropathy   - No chronic or active enteritis  - No glandular dysplasia and no evidence of malignancy  B  Colon   - Benign polypoid colonic mucosa with some features to suggest   hyperplastic polyp   - No epithelial dysplasia and no evidence of malignancy  Interpretation performed at [facility]  Electronically signed by Mar Napier MD on 2/4/2017 at 12:21 PM   LAB AP SURGICAL ADDITIONAL INFORMATION (Report)     These tests were developed and their performance characteristics   determined by [facility] or [facility]  They may not be cleared or approved by the [place]  Food and   Drug Administration  The FDA has determined that such clearance or   approval is not necessary  These tests are used for clinical purposes  They should not be regarded as investigational or for research  This   laboratory has been approved by CLIA 88, designated as a high-complexity   laboratory and is qualified to perform these tests  LAB AP GROSS DESCRIPTION (Report)     A   The specimen is received in formalin, labeled with the patient's name   and hospital number, and is designated  biopsy jejunum  The specimen   consists of several, rubbery, tan-brown tissue fragments measuring 0 4 x   0 4 x 0 2 cm in aggregate dimension  Entirely submitted  One cassette  B  The specimen is received in formalin, labeled with the patient's name   and hospital number, and is designated biopsy hepatic flexure  The   specimen consists of a single, rubbery, tan-brown tissue fragment   measuring up to 0 4 cm in greatest dimension  Entirely submitted  One   cassette  Note: The estimated total formalin fixation time based upon information   provided by the submitting clinician and the standard processing schedule   is 27 5 hours   SRS   LAB AP CLINICAL INFORMATION R/o celiac     R/o celiac

## 2018-01-15 VITALS
SYSTOLIC BLOOD PRESSURE: 126 MMHG | HEIGHT: 65 IN | BODY MASS INDEX: 31.99 KG/M2 | TEMPERATURE: 98.6 F | WEIGHT: 192 LBS | DIASTOLIC BLOOD PRESSURE: 80 MMHG

## 2018-07-10 DIAGNOSIS — I10 ESSENTIAL HYPERTENSION: Primary | ICD-10-CM

## 2018-07-10 RX ORDER — LEVOTHYROXINE SODIUM 0.07 MG/1
TABLET ORAL
Qty: 90 TABLET | Refills: 0 | Status: SHIPPED | OUTPATIENT
Start: 2018-07-10 | End: 2018-10-12 | Stop reason: SDUPTHER

## 2018-07-10 RX ORDER — LISINOPRIL 10 MG/1
TABLET ORAL
Qty: 90 TABLET | Refills: 1 | Status: SHIPPED | OUTPATIENT
Start: 2018-07-10 | End: 2018-12-30 | Stop reason: SDUPTHER

## 2018-07-10 NOTE — TELEPHONE ENCOUNTER
PT  NEW  TO  [facility]  ASKING  FOR  AN  RX  SHE  HAS  AN  APPT  WITH [facility]  ON Thursday  BUT  HAS  A    IN [place] THAT  SHE NEEDS  TO GO TO     COULD  SHE  GET HER  RX AND  CHANGE  HER  APPT  TO   WITH  [facility]  THAT IS  HIS NEXT  OPENING    CALL PT  WITH ANSWER PLEASE  [phone]

## 2018-07-13 ENCOUNTER — OFFICE VISIT (OUTPATIENT)
Dept: INTERNAL MEDICINE CLINIC | Facility: CLINIC | Age: 53
End: 2018-07-13
Payer: COMMERCIAL

## 2018-07-13 VITALS
HEART RATE: 77 BPM | WEIGHT: 215 LBS | DIASTOLIC BLOOD PRESSURE: 72 MMHG | OXYGEN SATURATION: 93 % | HEIGHT: 64 IN | SYSTOLIC BLOOD PRESSURE: 126 MMHG | BODY MASS INDEX: 36.7 KG/M2

## 2018-07-13 DIAGNOSIS — E03.9 ACQUIRED HYPOTHYROIDISM: Primary | ICD-10-CM

## 2018-07-13 DIAGNOSIS — Z72.0 TOBACCO USE: ICD-10-CM

## 2018-07-13 DIAGNOSIS — M79.7 FIBROMYALGIA, PRIMARY: ICD-10-CM

## 2018-07-13 DIAGNOSIS — I10 ESSENTIAL HYPERTENSION: ICD-10-CM

## 2018-07-13 DIAGNOSIS — F33.40 RECURRENT MAJOR DEPRESSIVE DISORDER, IN REMISSION (HCC): ICD-10-CM

## 2018-07-13 DIAGNOSIS — K90.9 INTESTINAL MALABSORPTION, UNSPECIFIED TYPE: ICD-10-CM

## 2018-07-13 DIAGNOSIS — I81 PORTAL VEIN THROMBOSIS: ICD-10-CM

## 2018-07-13 PROBLEM — F32.A DEPRESSION: Status: ACTIVE | Noted: 2018-07-13

## 2018-07-13 PROBLEM — K85.90 PANCREATITIS: Status: RESOLVED | Noted: 2017-07-30 | Resolved: 2018-07-13

## 2018-07-13 PROCEDURE — 99215 OFFICE O/P EST HI 40 MIN: CPT | Performed by: INTERNAL MEDICINE

## 2018-07-13 RX ORDER — BUPROPION HYDROCHLORIDE 100 MG/1
300 TABLET ORAL ONCE
COMMUNITY
End: 2020-03-25

## 2018-07-13 RX ORDER — ARIPIPRAZOLE 10 MG/1
10 TABLET ORAL DAILY
COMMUNITY
End: 2020-05-26

## 2018-07-13 NOTE — PROGRESS NOTES
Assessment/Plan:       Diagnoses and all orders for this visit:    Acquired hypothyroidism  -     CBC and differential; Future  -     Lipid Panel with Direct LDL reflex; Future  -     Comprehensive metabolic panel; Future  -     TSH, 3rd generation with Free T4 reflex; Future  -     Urinalysis with reflex to microscopic  -     T3, free; Future  -     Vitamin B12; Future  -     Folate; Future    Portal vein thrombosis    Recurrent major depressive disorder, in remission ([facility] [address] )    Intestinal malabsorption, unspecified type  -     CBC and differential; Future  -     Lipid Panel with Direct LDL reflex; Future  -     Comprehensive metabolic panel; Future  -     TSH, 3rd generation with Free T4 reflex; Future  -     Urinalysis with reflex to microscopic  -     T3, free; Future  -     Vitamin B12; Future  -     Folate; Future  -     Iron; Future  -     Ferritin; Future    Other orders  -     buPROPion (WELLBUTRIN) 100 mg tablet; Take 100 mg by mouth once  -     ARIPiprazole (ABILIFY) 10 mg tablet; Take 10 mg by mouth daily              Subjective:      Patient ID: Michaela Matos is a 46 y o  female  New patient visit  A 54-year-old woman    Her principal issue in life is a major depressive disorder  This is rooted in a series of life events  Felony conviction for social security fraud 4 years ago; the patient apparently was receiving social security/Disability payments, even though she was not really eligible  Chronic pain from fibromyalgia    Hypertensive treated with lisinopril  Hypo thyroid on replacement  She was unable to get medication for quite a while and only recently this was given to her again  Hospitalized about a year ago for acute pancreatitis  This was felt to be due to using doxycycline in an attempt to treat the chronic pain as some kind of occult Lyme disease    The attempt failed completely and she was hospitalized with pancreatitis which took a while to resolve    Annita-en-Y gastric bypass surgery done in  for maximum weight 260 lb  Complication of care was development of portal vein thrombosis  This required 7 years of anticoagulation  Essentially, no one was willing to take the risk to have her discontinue until almost   Off Coumadin since then with no recurrence of any other clot but portal vein thrombosis was noted during her hospitalization for pancreatitis    Or surgical history of bilateral hip replacement, cholecystectomy, 2  sections, and Annita-en-Y  Continued morbid obesity with slow but steady weight increase  She sees a therapist who in turn is supervised by a psychiatrist   German Zavala to pain management  Needs gynecology  Mammogram and colonoscopy are up today  , living up in [place]  2 children both college age  Not sexually active for many years  As a part of her multiple diagnoses she has complete loss of libido  She recognizes this but does not know what to do about it  Her therapist and psychiatrist or attempting to alleviate the situation by weaning her off Zoloft and switch her to bupropion  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    Smokeless cigarettes, rare alcohol, no illicit drugs  Father  from stroke with ASVD  Mother with hypertension hyperlipidemia  Also, history of aortic valve replacement due to rheumatic disease          The following portions of the patient's history were reviewed and updated as appropriate:   She has a past medical history of Anxiety; Arthritis; Depression; Fibromyalgia, primary; H/O  section; History of gastric surgery; History of total hip replacement; Hypertension; Hypothyroidism; and Portal vein thrombosis  ,   does not have any pertinent problems on file  ,   has a past surgical history that includes Gastric bypass; Joint replacement (Bilateral); Colonoscopy; pr lap,cholecystectomy/graph (N/A, 2017); pr esophagogastroduodenoscopy transoral diagnostic (N/A, 2017);  section;  Hip surgery (Bilateral); Cholecystectomy; and [place] tooth extraction  ,  family history includes Cervical cancer in her mother; Coronary artery disease in her father; Heart disease in her father; Hypertension in her brother, brother, brother, father, and mother; Other in her mother; Stroke in her father  ,   reports that she has been smoking Cigarettes  She has never used smokeless tobacco  She reports that she drinks alcohol  She reports that she uses drugs, including Marijuana  ,  has No Known Allergies     Current Outpatient Prescriptions   Medication Sig Dispense Refill    ALPRAZolam (XANAX) 1 mg tablet Take 2 mg by mouth as needed for anxiety        ARIPiprazole (ABILIFY) 10 mg tablet Take 10 mg by mouth daily      buPROPion (WELLBUTRIN) 100 mg tablet Take 100 mg by mouth once      levothyroxine 75 mcg tablet take 1 tablet by mouth once daily 90 tablet 0    lisinopril (ZESTRIL) 10 mg tablet take 1 tablet by mouth once daily 90 tablet 1    oxyCODONE-acetaminophen (PERCOCET)  mg per tablet Take 1 tablet by mouth every 4 (four) hours as needed for moderate pain      sertraline (ZOLOFT) 50 mg tablet Take 100 mg by mouth 2 (two) times a day        pantoprazole (PROTONIX) 40 mg tablet Take 1 tablet by mouth daily for 30 days 30 tablet 2     No current facility-administered medications for this visit  Review of Systems   Constitutional: Negative for chills and fever  HENT: Negative for sore throat and trouble swallowing  Eyes: Negative for pain  Respiratory: Negative for cough, shortness of breath and wheezing  Cardiovascular: Negative for chest pain and leg swelling  Gastrointestinal: Negative for abdominal pain, diarrhea, nausea and vomiting  Endocrine: Negative for cold intolerance and heat intolerance  Genitourinary: Negative for dysuria, frequency and pelvic pain  Musculoskeletal: Positive for arthralgias, back pain, myalgias and neck pain  Negative for joint swelling     Skin: Negative for rash and wound  Allergic/Immunologic: Negative for immunocompromised state  Neurological: Positive for headaches  Negative for dizziness, seizures and syncope  Psychiatric/Behavioral: Positive for dysphoric mood  Negative for suicidal ideas  The patient is nervous/anxious  Objective:  Vitals:    07/13/18 1649   BP: 126/72   Pulse: 77   SpO2: 93%      Physical Exam   Constitutional: She is oriented to person, place, and time  A significantly overweight female who appears stated age   HENT:   Head: Normocephalic and atraumatic  Eyes: EOM are normal  Pupils are equal, round, and reactive to light  Neck: Normal range of motion  Neck supple  No tracheal deviation present  No thyromegaly present  Cardiovascular: Normal rate, regular rhythm and normal heart sounds  Exam reveals no gallop  No murmur heard  Pulmonary/Chest: No respiratory distress  She has no wheezes  She has no rales  Abdominal: Soft  Bowel sounds are normal  There is no tenderness  Musculoskeletal: Normal range of motion  She exhibits no tenderness or deformity  Neurological: She is alert and oriented to person, place, and time  Coordination normal    Skin: Skin is warm  Psychiatric: She has a normal mood and affect   Judgment normal

## 2018-07-13 NOTE — PATIENT INSTRUCTIONS
A patient with multiple diagnoses as noted above  Back on Synthroid for few days; I suspect being off Synthroid for quite a while certainly contributed to the current weight gain   Recheck laboratory panels but do not do it now  Wait until late August or early September  Do the lab work then  After that, come back to see me again

## 2018-08-23 ENCOUNTER — TELEPHONE (OUTPATIENT)
Dept: INTERNAL MEDICINE CLINIC | Facility: CLINIC | Age: 53
End: 2018-08-23

## 2018-08-23 ENCOUNTER — APPOINTMENT (OUTPATIENT)
Dept: LAB | Facility: HOSPITAL | Age: 53
End: 2018-08-23
Attending: INTERNAL MEDICINE
Payer: COMMERCIAL

## 2018-08-23 DIAGNOSIS — K90.9 INTESTINAL MALABSORPTION, UNSPECIFIED TYPE: ICD-10-CM

## 2018-08-23 DIAGNOSIS — E03.9 ACQUIRED HYPOTHYROIDISM: ICD-10-CM

## 2018-08-23 LAB
ALBUMIN SERPL BCP-MCNC: 3.6 G/DL (ref 3.5–5)
ALP SERPL-CCNC: 105 U/L (ref 46–116)
ALT SERPL W P-5'-P-CCNC: 35 U/L (ref 12–78)
ANION GAP SERPL CALCULATED.3IONS-SCNC: 9 MMOL/L (ref 4–13)
AST SERPL W P-5'-P-CCNC: 23 U/L (ref 5–45)
BACTERIA UR QL AUTO: ABNORMAL /HPF
BASOPHILS # BLD AUTO: 0.06 THOUSANDS/ΜL (ref 0–0.1)
BASOPHILS NFR BLD AUTO: 2 % (ref 0–1)
BILIRUB SERPL-MCNC: 0.6 MG/DL (ref 0.2–1)
BILIRUB UR QL STRIP: NEGATIVE
BUN SERPL-MCNC: 16 MG/DL (ref 5–25)
CALCIUM SERPL-MCNC: 9 MG/DL (ref 8.3–10.1)
CHLORIDE SERPL-SCNC: 105 MMOL/L (ref 100–108)
CHOLEST SERPL-MCNC: 184 MG/DL (ref 50–200)
CLARITY UR: ABNORMAL
CO2 SERPL-SCNC: 28 MMOL/L (ref 21–32)
COLOR UR: YELLOW
CREAT SERPL-MCNC: 1 MG/DL (ref 0.6–1.3)
EOSINOPHIL # BLD AUTO: 0.12 THOUSAND/ΜL (ref 0–0.61)
EOSINOPHIL NFR BLD AUTO: 3 % (ref 0–6)
ERYTHROCYTE [DISTWIDTH] IN BLOOD BY AUTOMATED COUNT: 17 % (ref 11.6–15.1)
FERRITIN SERPL-MCNC: 4 NG/ML (ref 8–388)
FOLATE SERPL-MCNC: >20 NG/ML (ref 3.1–17.5)
GFR SERPL CREATININE-BSD FRML MDRD: 65 ML/MIN/1.73SQ M
GLUCOSE P FAST SERPL-MCNC: 88 MG/DL (ref 65–99)
GLUCOSE UR STRIP-MCNC: NEGATIVE MG/DL
HCT VFR BLD AUTO: 33.4 % (ref 34.8–46.1)
HDLC SERPL-MCNC: 58 MG/DL (ref 40–60)
HGB BLD-MCNC: 9.8 G/DL (ref 11.5–15.4)
HGB UR QL STRIP.AUTO: ABNORMAL
IMM GRANULOCYTES # BLD AUTO: 0.01 THOUSAND/UL (ref 0–0.2)
IMM GRANULOCYTES NFR BLD AUTO: 0 % (ref 0–2)
IRON SERPL-MCNC: 40 UG/DL (ref 50–170)
KETONES UR STRIP-MCNC: NEGATIVE MG/DL
LDLC SERPL CALC-MCNC: 115 MG/DL (ref 0–100)
LEUKOCYTE ESTERASE UR QL STRIP: ABNORMAL
LYMPHOCYTES # BLD AUTO: 1.12 THOUSANDS/ΜL (ref 0.6–4.47)
LYMPHOCYTES NFR BLD AUTO: 29 % (ref 14–44)
MCH RBC QN AUTO: 22.8 PG (ref 26.8–34.3)
MCHC RBC AUTO-ENTMCNC: 29.3 G/DL (ref 31.4–37.4)
MCV RBC AUTO: 78 FL (ref 82–98)
MONOCYTES # BLD AUTO: 0.28 THOUSAND/ΜL (ref 0.17–1.22)
MONOCYTES NFR BLD AUTO: 7 % (ref 4–12)
NEUTROPHILS # BLD AUTO: 2.3 THOUSANDS/ΜL (ref 1.85–7.62)
NEUTS SEG NFR BLD AUTO: 59 % (ref 43–75)
NITRITE UR QL STRIP: NEGATIVE
NON-SQ EPI CELLS URNS QL MICRO: ABNORMAL /HPF
NRBC BLD AUTO-RTO: 0 /100 WBCS
PH UR STRIP.AUTO: 5.5 [PH] (ref 4.5–8)
PLATELET # BLD AUTO: 237 THOUSANDS/UL (ref 149–390)
PMV BLD AUTO: 10.3 FL (ref 8.9–12.7)
POTASSIUM SERPL-SCNC: 4.3 MMOL/L (ref 3.5–5.3)
PROT SERPL-MCNC: 6.9 G/DL (ref 6.4–8.2)
PROT UR STRIP-MCNC: NEGATIVE MG/DL
RBC # BLD AUTO: 4.3 MILLION/UL (ref 3.81–5.12)
RBC #/AREA URNS AUTO: ABNORMAL /HPF
SODIUM SERPL-SCNC: 142 MMOL/L (ref 136–145)
SP GR UR STRIP.AUTO: >=1.03 (ref 1–1.03)
T3FREE SERPL-MCNC: 2.26 PG/ML (ref 2.3–4.2)
TRIGL SERPL-MCNC: 56 MG/DL
TSH SERPL DL<=0.05 MIU/L-ACNC: 2.88 UIU/ML (ref 0.36–3.74)
UROBILINOGEN UR QL STRIP.AUTO: 0.2 E.U./DL
VIT B12 SERPL-MCNC: 636 PG/ML (ref 100–900)
WBC # BLD AUTO: 3.89 THOUSAND/UL (ref 4.31–10.16)
WBC #/AREA URNS AUTO: ABNORMAL /HPF

## 2018-08-23 PROCEDURE — 84443 ASSAY THYROID STIM HORMONE: CPT

## 2018-08-23 PROCEDURE — 80053 COMPREHEN METABOLIC PANEL: CPT

## 2018-08-23 PROCEDURE — 36415 COLL VENOUS BLD VENIPUNCTURE: CPT

## 2018-08-23 PROCEDURE — 80061 LIPID PANEL: CPT

## 2018-08-23 PROCEDURE — 82728 ASSAY OF FERRITIN: CPT

## 2018-08-23 PROCEDURE — 85025 COMPLETE CBC W/AUTO DIFF WBC: CPT

## 2018-08-23 PROCEDURE — 83540 ASSAY OF IRON: CPT

## 2018-08-23 PROCEDURE — 81001 URINALYSIS AUTO W/SCOPE: CPT | Performed by: INTERNAL MEDICINE

## 2018-08-23 PROCEDURE — 82607 VITAMIN B-12: CPT

## 2018-08-23 PROCEDURE — 82746 ASSAY OF FOLIC ACID SERUM: CPT

## 2018-08-23 PROCEDURE — 84481 FREE ASSAY (FT-3): CPT

## 2018-08-23 NOTE — TELEPHONE ENCOUNTER
----- Message from Anneliese Velásquez MD sent at 8/23/2018 11:15 AM EDT -----  Make sure she has a follow-up office visit

## 2018-09-13 ENCOUNTER — TELEPHONE (OUTPATIENT)
Dept: INTERNAL MEDICINE CLINIC | Facility: CLINIC | Age: 53
End: 2018-09-13

## 2018-09-13 NOTE — TELEPHONE ENCOUNTER
Patient stated no one called her about blood results  Dr Kyle Stewart said her Iron Is low and her Hemoglobin has gotten better  Everything else is normal  Pt informed of this

## 2018-09-19 ENCOUNTER — OFFICE VISIT (OUTPATIENT)
Dept: INTERNAL MEDICINE CLINIC | Facility: CLINIC | Age: 53
End: 2018-09-19
Payer: COMMERCIAL

## 2018-09-19 VITALS — HEART RATE: 73 BPM | OXYGEN SATURATION: 98 % | DIASTOLIC BLOOD PRESSURE: 66 MMHG | SYSTOLIC BLOOD PRESSURE: 104 MMHG

## 2018-09-19 DIAGNOSIS — D50.8 IRON DEFICIENCY ANEMIA SECONDARY TO INADEQUATE DIETARY IRON INTAKE: ICD-10-CM

## 2018-09-19 DIAGNOSIS — M79.7 FIBROMYALGIA, PRIMARY: ICD-10-CM

## 2018-09-19 DIAGNOSIS — K90.9 INTESTINAL MALABSORPTION, UNSPECIFIED TYPE: ICD-10-CM

## 2018-09-19 DIAGNOSIS — Z72.0 TOBACCO USE: ICD-10-CM

## 2018-09-19 DIAGNOSIS — I10 ESSENTIAL HYPERTENSION: ICD-10-CM

## 2018-09-19 DIAGNOSIS — Z00.00 HEALTH CARE MAINTENANCE: ICD-10-CM

## 2018-09-19 DIAGNOSIS — Z23 NEED FOR IMMUNIZATION AGAINST INFLUENZA: Primary | ICD-10-CM

## 2018-09-19 DIAGNOSIS — E03.9 ACQUIRED HYPOTHYROIDISM: ICD-10-CM

## 2018-09-19 PROBLEM — I81 PORTAL VEIN THROMBOSIS: Status: RESOLVED | Noted: 2018-07-13 | Resolved: 2018-09-19

## 2018-09-19 PROCEDURE — 99213 OFFICE O/P EST LOW 20 MIN: CPT | Performed by: INTERNAL MEDICINE

## 2018-09-19 PROCEDURE — 90471 IMMUNIZATION ADMIN: CPT

## 2018-09-19 PROCEDURE — 90682 RIV4 VACC RECOMBINANT DNA IM: CPT

## 2018-09-19 RX ORDER — VILAZODONE HYDROCHLORIDE 40 MG/1
40 TABLET ORAL
COMMUNITY
End: 2020-05-26

## 2018-09-19 NOTE — PROGRESS NOTES
Assessment/Plan:       Diagnoses and all orders for this visit:    Need for immunization against influenza  -     influenza vaccine, [id number], quadrivalent, recombinant, PF, 0 5 mL, for patients 18 yr+ (FLUBLOK)    Tobacco use    Fibromyalgia, primary    Essential hypertension    Acquired hypothyroidism    Intestinal malabsorption, unspecified type  -     Ambulatory referral to Hematology / Oncology; Future    Iron deficiency anemia secondary to inadequate dietary iron intake  -     Ambulatory referral to Hematology / Oncology; Future    Health care maintenance  -     Ambulatory referral to Obstetrics / Gynecology; Future    Other orders  -     vilazodone (VIIBRYD) 40 mg tablet; Take 40 mg by mouth daily with breakfast          Patient Instructions   Fatigue with working diagnosis of iron deficiency  Need for OB    Chronic diagnoses    Continue oral iron but referred to Hematology Oncology for parenteral iron replacement  Subjective:      Patient ID: Kristen Worthy is a 46 y o  female  A 80-year-old woman    A lot of chronic issues were reviewed at the time of her 1st visit; these are outlined below  Today's visit reviews lab work which is essentially normal with the exception of iron deficiency anemia  She had Annita-en-Y gastric bypass in the working diagnosis is malabsorption  Although the hemoglobin is not that bad, the patient does complain of fatigue  I think that we should give her a trial of injectable on iron to alleviate this symptom and I will refer her to Hematology Oncology  Her principal issue in life is a major depressive disorder  This is rooted in a series of life events  Felony conviction for social security fraud 4 years ago; the patient apparently was receiving social security/Disability payments, even though she was not really eligible  Chronic pain from fibromyalgia    Hypertensive treated with lisinopril  Hypo thyroid on replacement    She was unable to get medication for quite a while and only recently this was given to her again  Hospitalized about a year ago for acute pancreatitis  This was felt to be due to using doxycycline in an attempt to treat the chronic pain as some kind of occult Lyme disease  The attempt failed completely and she was hospitalized with pancreatitis which took a while to resolve    Annita-en-Y gastric bypass surgery done in  for maximum weight 260 lb  Complication of care was development of portal vein thrombosis  This required 7 years of anticoagulation  Essentially, no one was willing to take the risk to have her discontinue until almost   Off Coumadin since then with no recurrence of any other clot but portal vein thrombosis was noted during her hospitalization for pancreatitis    Complication of care was development of iron deficiency presumptive diagnosis of malabsorption due to the Annita-en-Y  Anemic, low iron, low ferritin noted on recent laboratory testing  Surgical history of bilateral hip replacement, cholecystectomy, 2  sections, and Annita-en-Y  Continued morbid obesity with slow but steady weight increase  She sees a therapist who in turn is supervised by a psychiatrist   Melissa Stephens to pain management  Mammogram and colonoscopy are up today  , living up in [place]  2 children both college age  Not sexually active for many years  As a part of her multiple diagnoses she has complete loss of libido  She recognizes this but does not know what to do about it  Her therapist and psychiatrist or attempting to alleviate the situation by weaning her off Zoloft and switch her to bupropion  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    Smokeless cigarettes, rare alcohol, no illicit drugs  Father  from stroke with ASVD  Mother with hypertension hyperlipidemia    Also, history of aortic valve replacement due to rheumatic disease          The following portions of the patient's history were reviewed and updated as appropriate:   She has a past medical history of Anxiety; Arthritis; Depression; Fibromyalgia, primary; H/O  section; History of gastric surgery; History of total hip replacement; Hypertension; Hypothyroidism; and Portal vein thrombosis  ,   does not have any pertinent problems on file  ,   has a past surgical history that includes Gastric bypass; Joint replacement (Bilateral); Colonoscopy; pr lap,cholecystectomy/graph (N/A, 2017); pr esophagogastroduodenoscopy transoral diagnostic (N/A, 2017);  section; Hip surgery (Bilateral); Cholecystectomy; and [place] tooth extraction  ,  family history includes Cervical cancer in her mother; Coronary artery disease in her father; Heart disease in her father; Hypertension in her brother, brother, brother, father, and mother; Other in her mother; Stroke in her father  ,   reports that she has been smoking E-Cigarettes  She has never used smokeless tobacco  She reports that she drinks alcohol  She reports that she uses drugs, including Marijuana  ,  has No Known Allergies     Current Outpatient Prescriptions   Medication Sig Dispense Refill    ALPRAZolam (XANAX) 1 mg tablet Take 1 mg by mouth as needed for anxiety        ARIPiprazole (ABILIFY) 10 mg tablet Take 10 mg by mouth daily      buPROPion (WELLBUTRIN) 100 mg tablet Take 300 mg by mouth once        levothyroxine 75 mcg tablet take 1 tablet by mouth once daily 90 tablet 0    lisinopril (ZESTRIL) 10 mg tablet take 1 tablet by mouth once daily 90 tablet 1    oxyCODONE-acetaminophen (PERCOCET)  mg per tablet Take 1 tablet by mouth every 4 (four) hours as needed for moderate pain      vilazodone (VIIBRYD) 40 mg tablet Take 40 mg by mouth daily with breakfast       No current facility-administered medications for this visit  Review of Systems   Constitutional: Positive for fatigue  Respiratory: Negative for cough and shortness of breath  Cardiovascular: Negative for chest pain  Gastrointestinal: Negative for abdominal pain and anal bleeding  Genitourinary: Negative for difficulty urinating  Psychiatric/Behavioral: Negative for dysphoric mood  Objective:  Vitals:    09/19/18 1350   BP: 104/66   Pulse: 73   SpO2: 98%      Physical Exam   Constitutional: No distress  Significantly overweight patient who appears to be stated age in no distress   Pulmonary/Chest: Effort normal  No respiratory distress  Neurological: She is alert  Psychiatric: She has a normal mood and affect   Judgment normal

## 2018-09-19 NOTE — PATIENT INSTRUCTIONS
Fatigue with working diagnosis of iron deficiency  Need for OB    Chronic diagnoses    Continue oral iron but referred to Hematology Oncology for parenteral iron replacement

## 2018-10-12 DIAGNOSIS — I10 ESSENTIAL HYPERTENSION: ICD-10-CM

## 2018-10-12 RX ORDER — LEVOTHYROXINE SODIUM 0.07 MG/1
TABLET ORAL
Qty: 90 TABLET | Refills: 0 | Status: SHIPPED | OUTPATIENT
Start: 2018-10-12 | End: 2019-01-20 | Stop reason: SDUPTHER

## 2018-12-30 DIAGNOSIS — I10 ESSENTIAL HYPERTENSION: ICD-10-CM

## 2019-01-02 RX ORDER — LISINOPRIL 10 MG/1
TABLET ORAL
Qty: 90 TABLET | Refills: 1 | Status: SHIPPED | OUTPATIENT
Start: 2019-01-02 | End: 2019-05-13 | Stop reason: SDUPTHER

## 2019-01-20 DIAGNOSIS — I10 ESSENTIAL HYPERTENSION: ICD-10-CM

## 2019-01-21 RX ORDER — LEVOTHYROXINE SODIUM 0.07 MG/1
TABLET ORAL
Qty: 90 TABLET | Refills: 0 | Status: SHIPPED | OUTPATIENT
Start: 2019-01-21 | End: 2019-02-12 | Stop reason: SDUPTHER

## 2019-02-12 DIAGNOSIS — I10 ESSENTIAL HYPERTENSION: ICD-10-CM

## 2019-02-13 RX ORDER — LEVOTHYROXINE SODIUM 0.07 MG/1
TABLET ORAL
Qty: 90 TABLET | Refills: 0 | Status: SHIPPED | OUTPATIENT
Start: 2019-02-13 | End: 2019-05-13 | Stop reason: SDUPTHER

## 2019-05-13 DIAGNOSIS — I10 ESSENTIAL HYPERTENSION: ICD-10-CM

## 2019-05-13 RX ORDER — LISINOPRIL 10 MG/1
10 TABLET ORAL DAILY
Qty: 90 TABLET | Refills: 1 | Status: SHIPPED | OUTPATIENT
Start: 2019-05-13 | End: 2019-05-15 | Stop reason: SDUPTHER

## 2019-05-13 RX ORDER — LEVOTHYROXINE SODIUM 0.07 MG/1
75 TABLET ORAL DAILY
Qty: 90 TABLET | Refills: 1 | Status: SHIPPED | OUTPATIENT
Start: 2019-05-13 | End: 2019-05-15 | Stop reason: SDUPTHER

## 2019-05-15 ENCOUNTER — OFFICE VISIT (OUTPATIENT)
Dept: INTERNAL MEDICINE CLINIC | Facility: CLINIC | Age: 54
End: 2019-05-15
Payer: COMMERCIAL

## 2019-05-15 VITALS — HEART RATE: 95 BPM | SYSTOLIC BLOOD PRESSURE: 122 MMHG | OXYGEN SATURATION: 98 % | DIASTOLIC BLOOD PRESSURE: 72 MMHG

## 2019-05-15 DIAGNOSIS — I10 ESSENTIAL HYPERTENSION: ICD-10-CM

## 2019-05-15 DIAGNOSIS — E03.9 ACQUIRED HYPOTHYROIDISM: ICD-10-CM

## 2019-05-15 DIAGNOSIS — D50.8 IRON DEFICIENCY ANEMIA SECONDARY TO INADEQUATE DIETARY IRON INTAKE: ICD-10-CM

## 2019-05-15 DIAGNOSIS — F33.40 RECURRENT MAJOR DEPRESSIVE DISORDER, IN REMISSION (HCC): ICD-10-CM

## 2019-05-15 DIAGNOSIS — R06.02 SOB (SHORTNESS OF BREATH): ICD-10-CM

## 2019-05-15 DIAGNOSIS — K90.9 INTESTINAL MALABSORPTION, UNSPECIFIED TYPE: Primary | ICD-10-CM

## 2019-05-15 DIAGNOSIS — Z72.0 TOBACCO USE: ICD-10-CM

## 2019-05-15 PROCEDURE — 99214 OFFICE O/P EST MOD 30 MIN: CPT | Performed by: PHYSICIAN ASSISTANT

## 2019-05-15 PROCEDURE — 93000 ELECTROCARDIOGRAM COMPLETE: CPT | Performed by: PHYSICIAN ASSISTANT

## 2019-05-15 RX ORDER — LEVOTHYROXINE SODIUM 0.07 MG/1
75 TABLET ORAL DAILY
Qty: 90 TABLET | Refills: 2 | Status: SHIPPED | OUTPATIENT
Start: 2019-05-15 | End: 2020-04-07 | Stop reason: SDUPTHER

## 2019-05-15 RX ORDER — BUPRENORPHINE HYDROCHLORIDE AND NALOXONE HYDROCHLORIDE DIHYDRATE 2; .5 MG/1; MG/1
TABLET SUBLINGUAL
Refills: 0 | COMMUNITY
End: 2020-03-25

## 2019-05-15 RX ORDER — LISINOPRIL 10 MG/1
10 TABLET ORAL DAILY
Qty: 90 TABLET | Refills: 2 | Status: SHIPPED | OUTPATIENT
Start: 2019-05-15 | End: 2020-04-07 | Stop reason: SDUPTHER

## 2020-01-17 ENCOUNTER — TELEPHONE (OUTPATIENT)
Dept: INTERNAL MEDICINE CLINIC | Facility: CLINIC | Age: 55
End: 2020-01-17

## 2020-01-17 NOTE — TELEPHONE ENCOUNTER
Patient completed lab work on 01/13 and she wants to know the results       Please call the [phone]

## 2020-01-17 NOTE — TELEPHONE ENCOUNTER
She is quite anemic and has a very low vitamin-D level  Who ordered these tests? Us?   I have not seen her since 2018 she needs a visit to review these things and to get assessed and the visit should happen fairly quickly

## 2020-01-22 LAB
FERRITIN SERPL-MCNC: 4 NG/ML (ref 16–232)
HBA1C MFR BLD: 5.5 % OF TOTAL HGB
IRON SATN MFR SERPL: 6 % (CALC) (ref 16–45)
IRON SERPL-MCNC: 24 MCG/DL (ref 45–160)
TIBC SERPL-MCNC: 420 MCG/DL (CALC) (ref 250–450)

## 2020-01-23 ENCOUNTER — OFFICE VISIT (OUTPATIENT)
Dept: INTERNAL MEDICINE CLINIC | Facility: CLINIC | Age: 55
End: 2020-01-23
Payer: COMMERCIAL

## 2020-01-23 ENCOUNTER — TELEPHONE (OUTPATIENT)
Dept: SURGICAL ONCOLOGY | Facility: CLINIC | Age: 55
End: 2020-01-23

## 2020-01-23 ENCOUNTER — TELEPHONE (OUTPATIENT)
Dept: INTERNAL MEDICINE CLINIC | Facility: CLINIC | Age: 55
End: 2020-01-23

## 2020-01-23 VITALS — DIASTOLIC BLOOD PRESSURE: 90 MMHG | SYSTOLIC BLOOD PRESSURE: 120 MMHG | HEART RATE: 95 BPM | OXYGEN SATURATION: 93 %

## 2020-01-23 DIAGNOSIS — K90.9 MALABSORPTION SYNDROME: ICD-10-CM

## 2020-01-23 DIAGNOSIS — D50.8 IRON DEFICIENCY ANEMIA SECONDARY TO INADEQUATE DIETARY IRON INTAKE: Primary | ICD-10-CM

## 2020-01-23 DIAGNOSIS — E55.9 VITAMIN D DEFICIENCY: ICD-10-CM

## 2020-01-23 PROCEDURE — 99214 OFFICE O/P EST MOD 30 MIN: CPT | Performed by: INTERNAL MEDICINE

## 2020-01-23 RX ORDER — ERGOCALCIFEROL 1.25 MG/1
50000 CAPSULE ORAL WEEKLY
Qty: 12 CAPSULE | Refills: 3 | Status: SHIPPED | OUTPATIENT
Start: 2020-01-23 | End: 2020-12-21

## 2020-01-23 NOTE — PROGRESS NOTES
Assessment/Plan:       Diagnoses and all orders for this visit:    Iron deficiency anemia secondary to inadequate dietary iron intake  -     ergocalciferol (VITAMIN D2) 50,000 units; Take 1 capsule (50,000 Units total) by mouth once a week  -     DXA bone density spine hip and pelvis; Future  -     Ambulatory referral to Hematology / Oncology; Future    Vitamin D deficiency  -     ergocalciferol (VITAMIN D2) 50,000 units; Take 1 capsule (50,000 Units total) by mouth once a week  -     DXA bone density spine hip and pelvis; Future  -     Ambulatory referral to Hematology / Oncology; Future  -     Vitamin D 25 hydroxy; Future    Malabsorption syndrome  -     ergocalciferol (VITAMIN D2) 50,000 units; Take 1 capsule (50,000 Units total) by mouth once a week  -     DXA bone density spine hip and pelvis; Future  -     Ambulatory referral to Hematology / Oncology; Future  -     Vitamin D 25 hydroxy; Future                Subjective:      Patient ID: Bennie Graves is a 47 y o  female  A 51-year-old woman    The patient is principal symptom right now is fatigue  She had Annita-en-Y gastric bypass in 2002 and although her weight has crept back up her malabsorption remains  She is severely iron deficient with iron deficiency anemia and even has the symptom of ice craving and chewing  she also has severe vitamin-D deficiency  Vitamin-D level only 11  Once again, I think we can attribute this to malabsorption due to Annita-en-Y  Her principal issue in life is a major depressive disorder  This is rooted in a series of life events  Felony conviction for social security fraud 6 years ago; the patient apparently was receiving social security disability payments, even though she was not really eligible  Chronic pain from fibromyalgia    Hypertensive treated with lisinopril  Hypo thyroid on replacement  She was unable to get medication for quite a while and only recently this was given to her again      Hospitalized about 3 years ago for acute pancreatitis  This was felt to be due to using doxycycline in an attempt to treat the chronic pain as some kind of occult Lyme disease  The attempt failed completely and she was hospitalized with pancreatitis which took a while to resolve    Annita-en-Y gastric bypass surgery done in  for maximum weight 260 lb  Complication of care was development of portal vein thrombosis  This required 7 years of anticoagulation  Essentially, no one was willing to take the risk to have her discontinue until almost   Off Coumadin since then with no recurrence of any other clot but portal vein thrombosis was noted during her hospitalization for pancreatitis    Complication of care was development of iron deficiency presumptive diagnosis of malabsorption due to the Annita-en-Y  Anemic, low iron, low ferritin noted on recent laboratory testing  Surgical history of bilateral hip replacement, cholecystectomy, 2  sections, and Annita-en-Y  Continued morbid obesity with slow but steady weight increase  She sees a therapist who in turn is supervised by a psychiatrist     She had seen a pain management physician for chronic low back pain but that physician thinks that she either has to live with her pain     Mammogram and colonoscopy are up today  , living up in [place]  2 children both college age  Or go for a surgical consultation  Children are out of the house and she is bored  Not sexually active for many years  As a part of her multiple diagnoses she has complete loss of libido  She recognizes this but does not know what to do about it  Her therapist and psychiatrist or attempting to alleviate the situation by weaning her off Zoloft and switch her to bupropion  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    Smokeless cigarettes, rare alcohol, no illicit drugs  Father  from stroke with ASVD  Mother with hypertension hyperlipidemia    Also, history of aortic valve replacement due to rheumatic disease        The following portions of the patient's history were reviewed and updated as appropriate:   She has a past medical history of Anxiety, Arthritis, Depression, Fibromyalgia, primary, H/O  section, History of gastric surgery, History of total hip replacement, Hypertension, Hypothyroidism, and Portal vein thrombosis  ,  does not have any pertinent problems on file  ,   has a past surgical history that includes Gastric bypass; Joint replacement (Bilateral); Colonoscopy; pr lap,cholecystectomy/graph (N/A, 2017); pr esophagogastroduodenoscopy transoral diagnostic (N/A, 2017);  section; Hip surgery (Bilateral); Cholecystectomy; and [place] tooth extraction  ,  family history includes Cervical cancer in her mother; Coronary artery disease in her father; Heart disease in her father; Hypertension in her brother, brother, brother, father, and mother; Other in her mother; Stroke in her father  ,   reports that she has been smoking e-cigarettes  She has never used smokeless tobacco  She reports that she drinks alcohol  She reports that she has current or past drug history  Drug: Marijuana  ,  has No Known Allergies     Current Outpatient Medications   Medication Sig Dispense Refill    ALPRAZolam (XANAX) 1 mg tablet Take 1 mg by mouth as needed for anxiety        levothyroxine 75 mcg tablet Take 1 tablet (75 mcg total) by mouth daily 90 tablet 2    lisinopril (ZESTRIL) 10 mg tablet Take 1 tablet (10 mg total) by mouth daily 90 tablet 2    ARIPiprazole (ABILIFY) 10 mg tablet Take 10 mg by mouth daily      buprenorphine-naloxone (SUBOXONE) 2-0 5 mg per SL tablet TAKE 1 TABLET SUBLINGUALLY TWICE A DAY FOR OPIOID DEPENDENCY  0    buPROPion (WELLBUTRIN) 100 mg tablet Take 300 mg by mouth once        ergocalciferol (VITAMIN D2) 50,000 units Take 1 capsule (50,000 Units total) by mouth once a week 12 capsule 3    oxyCODONE-acetaminophen (PERCOCET)  mg per tablet Take 1 tablet by mouth every 4 (four) hours as needed for moderate pain      vilazodone (VIIBRYD) 40 mg tablet Take 40 mg by mouth daily with breakfast       No current facility-administered medications for this visit  Review of Systems   Constitutional: Positive for fatigue  HENT: Negative for congestion  Respiratory: Negative for cough and shortness of breath  Cardiovascular: Negative for chest pain  Gastrointestinal: Negative for abdominal pain, anal bleeding, nausea and vomiting  Endocrine: Positive for cold intolerance  Genitourinary: Negative for difficulty urinating and frequency  Musculoskeletal: Positive for arthralgias and back pain  Skin: Positive for pallor and rash  Neurological: Negative for numbness  Psychiatric/Behavioral: Positive for dysphoric mood  Objective:  Vitals:    01/23/20 1003   BP: 120/90   Pulse: 95   SpO2: 93%      Physical Exam   Constitutional: No distress  Significantly overweight patient who appears to be stated age in no distress   Eyes: No scleral icterus  Neck: Normal range of motion  Pulmonary/Chest: Effort normal  No respiratory distress  Neurological: She is alert  Psychiatric: She has a normal mood and affect  Judgment normal          Patient Instructions    A patient with well documented deficiency states due to malabsorption in turn due to Annita-en-Y  Very low iron levels with anemia, fatigue, and ice craving  Low vitamin-D     Patient will need intravenous iron; will refer to Hematology-Oncology      Will try ergo calciferol 50344 units weekly initially x4 weeks with recheck a vitamin-D level although I suspect this will need a higher dose    Clinical revisit here in about 4 months

## 2020-01-23 NOTE — TELEPHONE ENCOUNTER
New Patient Encounter    New Patient Intake Form   Patient Details:  Chepe Smith  1965  [id number]    Background Information:  [address] starts by opening a telephone encounter and gathering the following information   Who is calling to schedule? If not self, relationship to patient? self   Referring Provider Fausto Pitts   What is the diagnosis? anemia   Is this diagnosis confirmed Yes   Is there a confirmed diagnosis from a biopsy/tissue reviewed by pathology? n/a   Is there any prior history of Cancer? n/a   If yes, please explain n/a   When was the diagnosis? 1/2020   Is patient aware of diagnosis? Yes   Reason for visit? NP DX   Have you had any testing done? If so: when, where? Yes   Are records in [facility]? yes   Was the patient told to bring a disk? no   Scheduling Information:   Preferred [place]:  [place] [id number]   Requesting Specific Provider? no   Are there any dates/time the patient cannot be seen? no      Miscellaneous: pt wants asap ---this was first available -- finance please check her insurance  After completing the above information, please route to Financial Counselor and the appropriate Nurse Navigator for review

## 2020-01-23 NOTE — PATIENT INSTRUCTIONS
A patient with well documented deficiency states due to malabsorption in turn due to Annita-en-Y  Very low iron levels with anemia, fatigue, and ice craving  Low vitamin-D     Patient will need intravenous iron; will refer to Hematology-Oncology      Will try ergo calciferol 80357 units weekly initially x4 weeks with recheck a vitamin-D level although I suspect this will need a higher dose    Clinical revisit here in about 4 months

## 2020-01-23 NOTE — TELEPHONE ENCOUNTER
I do not know any others  She will have to find out from the insurance carrier and then I will call that party to try to get something moving  Ask her to make those phone calls and then let me know tomorrow or Monday

## 2020-01-23 NOTE — TELEPHONE ENCOUNTER
said she is severely anemic and needs Iron infusion ASAP  Anival Gilliam She called [facility] and was told Dr has to call and make the appt for her    She said to call  [phone]   [facility] She spoke to Saud Maldonado, to make the appt    She would remedios to get this scheduled for TODAY    If possible   [facility] Because of her condition    Call patient to let her know when the appt is

## 2020-01-23 NOTE — TELEPHONE ENCOUNTER
She is moderately anemic  She is severely iron deficient  I need to schedule her with the hematologist who was able to do this  I cannot order the iron infusion  This will happen relatively quickly  It will not happen today  He does not need to happen today

## 2020-01-29 NOTE — TELEPHONE ENCOUNTER
----- Message from Abby Mcdermott MD sent at 9/13/2018  4:05 PM EDT -----  Normal mammogram
Informed patient of normal mammo
none

## 2020-03-04 ENCOUNTER — DOCUMENTATION (OUTPATIENT)
Dept: GASTROENTEROLOGY | Facility: CLINIC | Age: 55
End: 2020-03-04

## 2020-03-04 NOTE — LETTER
3/4/2020    Dear Lexy [place],    Review of our records shows you are due for the following:    Colonoscopy    Please call the following office to schedule your appointment:    [facility]    We look forward to hearing from you      Sincerely,    Dr Maria Del Carmen Merrill

## 2020-03-19 ENCOUNTER — TELEPHONE (OUTPATIENT)
Dept: HEMATOLOGY ONCOLOGY | Facility: CLINIC | Age: 55
End: 2020-03-19

## 2020-03-19 NOTE — TELEPHONE ENCOUNTER
Spoke to patient and informed her that her appt on 3/24/20 needed to be R/S Her new appt is 3/26/20 at 2:30 pm at the [facility] location with PA [facility]  Patient was fine with the appt change

## 2020-03-25 ENCOUNTER — TELEPHONE (OUTPATIENT)
Dept: HEMATOLOGY ONCOLOGY | Facility: CLINIC | Age: 55
End: 2020-03-25

## 2020-03-25 NOTE — TELEPHONE ENCOUNTER
Patient Screen Calls:    Hello, this is [place] and I am calling from [facility] Hematology/Oncology with KULWINDER [facility]  May I speak with Antelmo Pratt? I am calling to remind you of your appointment on March 26, 2020 at 2:30pm        Considering the current events related to the COVID-19 virus and in being proactive and making sure we are keeping our patients, their families and staff safe, we are screening prior to all patient appointments  Would you interested in a virtual visit with your Hematologist/Oncologist at the same scheduled time? You have the option of a telephone visit or a tele-visit  The telephone visit would entail KULWINDER Ruiz calling you and having your scheduled visit over the phone and a tele-visit entails a video connect between you and the provider  You would still get that  feel of a face to face visit but through video while both of which are done with you in the comfort of your own home  Mirella Clay would like a Telephone visit  Screening questions:    1  Are you currently experiencing symptoms of cough, fever and/or shortness of breath? No    2  Have you traveled internationally in the last month to any foreign country or area within the [place] that has reported cases of COVID-19? No    3  In the last month have you been in contact with someone who was confirmed or suspected to have Coronavirus (COVID-19)? No    4  Have you recently worked in a facility with a reported case of COVID-19?  No

## 2020-03-26 ENCOUNTER — TELEMEDICINE (OUTPATIENT)
Dept: HEMATOLOGY ONCOLOGY | Facility: CLINIC | Age: 55
End: 2020-03-26
Payer: COMMERCIAL

## 2020-03-26 DIAGNOSIS — K90.9 MALABSORPTION SYNDROME: ICD-10-CM

## 2020-03-26 DIAGNOSIS — D50.8 IRON DEFICIENCY ANEMIA SECONDARY TO INADEQUATE DIETARY IRON INTAKE: ICD-10-CM

## 2020-03-26 PROCEDURE — 99443 PR PHYS/QHP TELEPHONE EVALUATION 21-30 MIN: CPT | Performed by: PHYSICIAN ASSISTANT

## 2020-03-26 RX ORDER — SODIUM CHLORIDE 9 MG/ML
20 INJECTION, SOLUTION INTRAVENOUS ONCE
Status: CANCELLED | OUTPATIENT
Start: 2020-04-02

## 2020-03-26 NOTE — PROGRESS NOTES
Virtual Regular Visit    Problem List Items Addressed This Visit        Digestive    Malabsorption syndrome       Other    Iron deficiency anemia secondary to inadequate dietary iron intake      Other Visit Diagnoses     Vitamin D deficiency            Chief complaint/Reason for visit:  New patient, consult for iron deficiency anemia secondary to gastric bypass surgery    Encounter provider Dontae Bynum PA-C    Provider located at [address] [place] [address]    Recent Visits  No visits were found meeting these conditions  Showing recent visits within past 7 days and meeting all other requirements     Future Appointments  No visits were found meeting these conditions  Showing future appointments within next 150 days and meeting all other requirements      After connecting through telephone, the patient was identified by name and date of birth  Nancy Anand was informed that this is a telemedicine visit and that the visit is being conducted through telephone which may not be secure and therefore, might not be HIPAA-compliant  My office door was closed  No one else was in the room  She acknowledged consent and understanding of privacy and security of the video platform  The patient has agreed to participate and understands they can discontinue the visit at any time  Subjective:  Nancy Anand is a 47 y o  female with past medical history of Annita-en-Y bariatric surgery in January 2002, anxiety, depression, fibromyalgia, hypertension, hypothyroidism and portal vein thrombosis who presents via telephone to the Hematology office for evaluation of iron deficiency anemia  Patient notes that she has been iron deficient in the past   She previously took oral iron supplementation however in the last year so, this has not been as effective to bring up her iron levels    Patient was initially evaluated at [facility] however secondary to insurance issues, she was unable to complete infusions as scheduled  Patient reports that she did have 1 dose of IV iron  Some of her symptoms did resolve however she still remains deficient  Patient's last colonoscopy was 3-4 years ago by Dr Arturo Green at that time she had 2 polyps removed with negative pathology  Patient's last menstrual period was approximately 12-13 years ago patient went through menopause at the age of 43 and has not had any vaginal bleeding since  Pertinent family history includes breast cancel in her in her maternal aunt and her mom with cervical cancer at the age of 35  Patient has 2 children a daughter that is 24years of age and a son at 21years of age both alive and well without issues  Past Medical History:   Diagnosis Date    Anxiety     Arthritis     Depression     Fibromyalgia, primary     H/O  section     History of gastric surgery     for morbid obesity    History of total hip replacement     Hypertension     Hypothyroidism     Portal vein thrombosis      Past Surgical History:   Procedure Laterality Date     SECTION      two    CHOLECYSTECTOMY      COLONOSCOPY      GASTRIC BYPASS      HIP SURGERY Bilateral     total hip replacement    JOINT REPLACEMENT Bilateral     hips    NH ESOPHAGOGASTRODUODENOSCOPY TRANSORAL DIAGNOSTIC N/A 2017    Procedure: EGD AND COLONOSCOPY;  Surgeon: Gladis Her MD;  Location: [facility];   Service: Gastroenterology    NH LAP,CHOLECYSTECTOMY/GRAPH N/A 2017    Procedure: LAPAROSCOPIC CHOLECYSTECTOMY WITH IOC ;  Surgeon: Brain Zaldivar MD;  Location: [facility];  Service: General    WISDOM TOOTH EXTRACTION       Current Outpatient Medications   Medication Sig Dispense Refill    ALPRAZolam (XANAX) 1 mg tablet Take 1 mg by mouth as needed for anxiety        ARIPiprazole (ABILIFY) 10 mg tablet Take 10 mg by mouth daily      Brexpiprazole (REXULTI) 2 MG tablet Take 2 mg by mouth daily      ergocalciferol (VITAMIN D2) 50,000 units Take 1 capsule (50,000 Units total) by mouth once a week 12 capsule 3    Levomilnacipran HCl ER (FETZIMA) 40 MG extended release capsule Take 40 mg by mouth daily      levothyroxine 75 mcg tablet Take 1 tablet (75 mcg total) by mouth daily 90 tablet 2    lisinopril (ZESTRIL) 10 mg tablet Take 1 tablet (10 mg total) by mouth daily 90 tablet 2    vilazodone (VIIBRYD) 40 mg tablet Take 40 mg by mouth daily with breakfast       No current facility-administered medications for this visit  No Known Allergies    Review of Systems   Constitutional: Negative for appetite change, fatigue, fever and unexpected weight change  HENT: Negative for nosebleeds  Respiratory: Negative for cough, choking and shortness of breath  Negative hemoptysis  Cardiovascular: Negative for chest pain, palpitations and leg swelling  Gastrointestinal: Negative  Negative for abdominal distention, abdominal pain, anal bleeding, blood in stool, constipation, diarrhea, nausea and vomiting  Endocrine: Negative  Negative for cold intolerance  Genitourinary: Negative  Negative for hematuria, menstrual problem, vaginal bleeding, vaginal discharge and vaginal pain  Musculoskeletal: Negative  Negative for arthralgias, myalgias, neck pain and neck stiffness  Skin: Negative  Negative for color change, pallor and rash  Allergic/Immunologic: Negative  Negative for immunocompromised state  Neurological: Negative  Negative for weakness and headaches  Hematological: Negative for adenopathy  Does not bruise/bleed easily  All other systems reviewed and are negative  Patient notes that she has been chewing on ice  Denies melena, hematochezia, hemoptysis, hematuria    No visits with results within 2 Month(s) from this visit     Latest known visit with results is:   Orders Only on 01/21/2020   Component Date Value Ref Range Status    Iron, Serum 01/21/2020 24* 45 - 160 mcg/dL Final    Total Iron Binding Capacity (TIBC) 01/21/2020 420  250 - 450 mcg/dL (calc) Final    Iron Saturation 01/21/2020 6* 16 - 45 % (calc) Final    Ferritin 01/21/2020 4* 16 - 232 ng/mL Final    Hemoglobin A1C 01/21/2020 5 5  <5 7 % of total Hgb Final    Comment: For the purpose of screening for the presence of  diabetes:     <5 7%       Consistent with the absence of diabetes  5 7-6 4%    Consistent with increased risk for diabetes              (prediabetes)  > or =6 5%  Consistent with diabetes     This assay result is consistent with a decreased risk  of diabetes  Currently, no consensus exists regarding use of  hemoglobin A1c for diagnosis of diabetes in children  According to American Diabetes Association (ADA)  guidelines, hemoglobin A1c <7 0% represents optimal  control in non-pregnant diabetic patients  Different  metrics may apply to specific patient populations  Standards of Medical Care in Diabetes(ADA)  A/P:  1  Iron deficiency anemia secondary to inadequate dietary iron intake  This is a 59-year-old female with history of iron deficiency anemia likely secondary to history of Annita-en-Y gastric bypass surgery in January 2002  Patient was advised to follow-up with GI physician for repeat colonoscopy as she is due for routine screening but also to rule out GI blood loss as the patient has had gastric bypass for over 18 years but this has been the only time that she has needed IV iron supplementation  Patient had 1 dose of IV iron at previous hematologist   We will recheck iron studies before patient received ease Feraheme at her 1st appointment at the [facility]  Patient is planned to complete 2 doses of the medication at that time  I briefly discussed with the patient the idea of maintenance iron especially in individuals who have had bariatric surgery in the past   We will discuss this at her follow-up appointment in 3 months      We discussed potential side effects to [facility] which include but are not limited to infusion site reactions, nausea, vomiting, hypotension and anaphylaxis  Patient will discuss any symptoms during the infusion with her infusion center nurse  Patient verbally consented to receiving therapy  Patient has some financial concerns for which I have sent an e-mail to Oncology finance regarding approval of the medication  We discussed that if Feraheme was not covered, we would transition her care to [facility]  Regimen:  Feraheme 510 mg IV Weekly x 2 doses  Iron panel to be drawn with 1st IV iron infusion     - Ambulatory referral to Hematology / Oncology  - Iron Panel (Includes Ferritin, Iron Sat%, Iron, and TIBC); Future  - CBC and differential; Future  - Comprehensive metabolic panel; Future    2  Malabsorption syndrome  Patient had additional blood work including copper levels, B12, folate, reticulocyte count due to borderline leukopenia  All of these lab test came back within normal limits  Repeat CBC demonstrated a white blood cell count within normal limits as well as a normal differential     However, the patient has history of bariatric surgery would put her at higher risk for B12 deficiency  Patient will have MMA drawn prior to follow-up appointment to assess this  - Ambulatory referral to Hematology / Oncology  - Iron Panel (Includes Ferritin, Iron Sat%, Iron, and TIBC); Future  - Methylmalonic acid, serum; Future    I spent approximately 40 minutes with the patient over the phone discussing treatment obtaining history answering questions

## 2020-04-02 ENCOUNTER — HOSPITAL ENCOUNTER (OUTPATIENT)
Dept: INFUSION CENTER | Facility: CLINIC | Age: 55
Discharge: HOME | End: 2020-04-02
Payer: COMMERCIAL

## 2020-04-02 VITALS
TEMPERATURE: 98.2 F | SYSTOLIC BLOOD PRESSURE: 142 MMHG | OXYGEN SATURATION: 100 % | DIASTOLIC BLOOD PRESSURE: 80 MMHG | HEART RATE: 88 BPM | RESPIRATION RATE: 18 BRPM

## 2020-04-02 DIAGNOSIS — K90.9 MALABSORPTION SYNDROME: ICD-10-CM

## 2020-04-02 DIAGNOSIS — D50.8 IRON DEFICIENCY ANEMIA SECONDARY TO INADEQUATE DIETARY IRON INTAKE: Primary | ICD-10-CM

## 2020-04-02 PROCEDURE — 96365 THER/PROPH/DIAG IV INF INIT: CPT

## 2020-04-02 RX ORDER — SODIUM CHLORIDE 9 MG/ML
20 INJECTION, SOLUTION INTRAVENOUS ONCE
Status: COMPLETED | OUTPATIENT
Start: 2020-04-02 | End: 2020-04-02

## 2020-04-02 RX ORDER — SODIUM CHLORIDE 9 MG/ML
20 INJECTION, SOLUTION INTRAVENOUS ONCE
Status: CANCELLED | OUTPATIENT
Start: 2020-04-09

## 2020-04-02 RX ADMIN — SODIUM CHLORIDE 20 ML/HR: 0.9 INJECTION, SOLUTION INTRAVENOUS at 14:58

## 2020-04-02 RX ADMIN — FERUMOXYTOL 510 MG: 510 INJECTION INTRAVENOUS at 14:37

## 2020-04-07 DIAGNOSIS — I10 ESSENTIAL HYPERTENSION: ICD-10-CM

## 2020-04-07 RX ORDER — LEVOTHYROXINE SODIUM 0.07 MG/1
75 TABLET ORAL DAILY
Qty: 90 TABLET | Refills: 2 | Status: SHIPPED | OUTPATIENT
Start: 2020-04-07 | End: 2020-05-21 | Stop reason: SDUPTHER

## 2020-04-07 RX ORDER — LISINOPRIL 10 MG/1
10 TABLET ORAL DAILY
Qty: 90 TABLET | Refills: 2 | Status: SHIPPED | OUTPATIENT
Start: 2020-04-07 | End: 2020-05-21 | Stop reason: SDUPTHER

## 2020-04-09 ENCOUNTER — HOSPITAL ENCOUNTER (OUTPATIENT)
Dept: INFUSION CENTER | Facility: CLINIC | Age: 55
Discharge: HOME | End: 2020-04-09
Payer: COMMERCIAL

## 2020-04-09 VITALS
HEART RATE: 86 BPM | SYSTOLIC BLOOD PRESSURE: 117 MMHG | OXYGEN SATURATION: 100 % | RESPIRATION RATE: 18 BRPM | TEMPERATURE: 97.7 F | DIASTOLIC BLOOD PRESSURE: 75 MMHG

## 2020-04-09 DIAGNOSIS — D50.8 IRON DEFICIENCY ANEMIA SECONDARY TO INADEQUATE DIETARY IRON INTAKE: Primary | ICD-10-CM

## 2020-04-09 DIAGNOSIS — K90.9 MALABSORPTION SYNDROME: ICD-10-CM

## 2020-04-09 LAB
FERRITIN SERPL-MCNC: 270 NG/ML (ref 8–388)
IRON SATN MFR SERPL: 34 %
IRON SERPL-MCNC: 118 UG/DL (ref 50–170)
TIBC SERPL-MCNC: 345 UG/DL (ref 250–450)

## 2020-04-09 PROCEDURE — 83540 ASSAY OF IRON: CPT | Performed by: PHYSICIAN ASSISTANT

## 2020-04-09 PROCEDURE — 82728 ASSAY OF FERRITIN: CPT | Performed by: PHYSICIAN ASSISTANT

## 2020-04-09 PROCEDURE — 83550 IRON BINDING TEST: CPT | Performed by: PHYSICIAN ASSISTANT

## 2020-04-09 PROCEDURE — 96365 THER/PROPH/DIAG IV INF INIT: CPT

## 2020-04-09 RX ORDER — SODIUM CHLORIDE 9 MG/ML
20 INJECTION, SOLUTION INTRAVENOUS ONCE
Status: COMPLETED | OUTPATIENT
Start: 2020-04-09 | End: 2020-04-09

## 2020-04-09 RX ORDER — SODIUM CHLORIDE 9 MG/ML
20 INJECTION, SOLUTION INTRAVENOUS ONCE
Status: CANCELLED | OUTPATIENT
Start: 2020-04-09

## 2020-04-09 RX ADMIN — SODIUM CHLORIDE 20 ML/HR: 0.9 INJECTION, SOLUTION INTRAVENOUS at 14:20

## 2020-04-09 RX ADMIN — FERUMOXYTOL 510 MG: 510 INJECTION INTRAVENOUS at 14:32

## 2020-04-14 ENCOUNTER — DOCUMENTATION (OUTPATIENT)
Dept: HEMATOLOGY ONCOLOGY | Facility: CLINIC | Age: 55
End: 2020-04-14

## 2020-04-15 ENCOUNTER — DOCUMENTATION (OUTPATIENT)
Dept: HEMATOLOGY ONCOLOGY | Facility: CLINIC | Age: 55
End: 2020-04-15

## 2020-05-21 DIAGNOSIS — I10 ESSENTIAL HYPERTENSION: ICD-10-CM

## 2020-05-21 RX ORDER — TRAZODONE HYDROCHLORIDE 50 MG/1
TABLET ORAL
COMMUNITY
Start: 2020-03-25 | End: 2022-02-16

## 2020-05-22 ENCOUNTER — TELEPHONE (OUTPATIENT)
Dept: ADMINISTRATIVE | Facility: OTHER | Age: 55
End: 2020-05-22

## 2020-05-22 RX ORDER — LISINOPRIL 10 MG/1
10 TABLET ORAL DAILY
Qty: 90 TABLET | Refills: 2 | Status: SHIPPED | OUTPATIENT
Start: 2020-05-22 | End: 2020-12-01

## 2020-05-22 RX ORDER — LEVOTHYROXINE SODIUM 0.07 MG/1
75 TABLET ORAL DAILY
Qty: 90 TABLET | Refills: 2 | Status: SHIPPED | OUTPATIENT
Start: 2020-05-22 | End: 2020-11-16

## 2020-05-23 ENCOUNTER — TELEPHONE (OUTPATIENT)
Dept: INTERNAL MEDICINE CLINIC | Facility: CLINIC | Age: 55
End: 2020-05-23

## 2020-05-26 ENCOUNTER — OFFICE VISIT (OUTPATIENT)
Dept: INTERNAL MEDICINE CLINIC | Facility: CLINIC | Age: 55
End: 2020-05-26
Payer: COMMERCIAL

## 2020-05-26 VITALS
DIASTOLIC BLOOD PRESSURE: 80 MMHG | SYSTOLIC BLOOD PRESSURE: 110 MMHG | OXYGEN SATURATION: 98 % | HEART RATE: 92 BPM | TEMPERATURE: 97.5 F

## 2020-05-26 DIAGNOSIS — E03.9 ACQUIRED HYPOTHYROIDISM: ICD-10-CM

## 2020-05-26 DIAGNOSIS — Z00.00 HEALTHCARE MAINTENANCE: ICD-10-CM

## 2020-05-26 DIAGNOSIS — Z12.39 BREAST SCREENING: ICD-10-CM

## 2020-05-26 DIAGNOSIS — K90.9 MALABSORPTION SYNDROME: Primary | ICD-10-CM

## 2020-05-26 DIAGNOSIS — I10 ESSENTIAL HYPERTENSION: ICD-10-CM

## 2020-05-26 DIAGNOSIS — Z12.11 COLON CANCER SCREENING: ICD-10-CM

## 2020-05-26 PROCEDURE — 3074F SYST BP LT 130 MM HG: CPT | Performed by: INTERNAL MEDICINE

## 2020-05-26 PROCEDURE — 99213 OFFICE O/P EST LOW 20 MIN: CPT | Performed by: INTERNAL MEDICINE

## 2020-05-26 PROCEDURE — 3079F DIAST BP 80-89 MM HG: CPT | Performed by: INTERNAL MEDICINE

## 2020-05-26 PROCEDURE — 1036F TOBACCO NON-USER: CPT | Performed by: INTERNAL MEDICINE

## 2020-06-30 ENCOUNTER — TELEPHONE (OUTPATIENT)
Dept: HEMATOLOGY ONCOLOGY | Facility: CLINIC | Age: 55
End: 2020-06-30

## 2020-07-01 ENCOUNTER — TELEPHONE (OUTPATIENT)
Dept: HEMATOLOGY ONCOLOGY | Facility: CLINIC | Age: 55
End: 2020-07-01

## 2020-07-09 DIAGNOSIS — R06.02 SOB (SHORTNESS OF BREATH): Primary | ICD-10-CM

## 2020-07-09 DIAGNOSIS — R06.02 SOB (SHORTNESS OF BREATH): ICD-10-CM

## 2020-07-09 PROCEDURE — U0003 INFECTIOUS AGENT DETECTION BY NUCLEIC ACID (DNA OR RNA); SEVERE ACUTE RESPIRATORY SYNDROME CORONAVIRUS 2 (SARS-COV-2) (CORONAVIRUS DISEASE [COVID-19]), AMPLIFIED PROBE TECHNIQUE, MAKING USE OF HIGH THROUGHPUT TECHNOLOGIES AS DESCRIBED BY CMS-2020-01-R: HCPCS

## 2020-07-14 ENCOUNTER — TELEPHONE (OUTPATIENT)
Dept: INTERNAL MEDICINE CLINIC | Facility: CLINIC | Age: 55
End: 2020-07-14

## 2020-07-16 ENCOUNTER — TELEPHONE (OUTPATIENT)
Dept: INTERNAL MEDICINE CLINIC | Facility: CLINIC | Age: 55
End: 2020-07-16

## 2020-07-16 LAB — SARS-COV-2 RNA SPEC QL NAA+PROBE: NOT DETECTED

## 2020-07-16 NOTE — TELEPHONE ENCOUNTER
----- Message from Chapis Marin PA-C sent at 7/16/2020  7:58 AM EDT -----  Please  left her know that her COVID is negative

## 2020-11-14 DIAGNOSIS — I10 ESSENTIAL HYPERTENSION: ICD-10-CM

## 2020-11-16 RX ORDER — LEVOTHYROXINE SODIUM 75 MCG
TABLET ORAL
Qty: 90 TABLET | Refills: 2 | Status: SHIPPED | OUTPATIENT
Start: 2020-11-16 | End: 2021-06-21

## 2020-11-30 DIAGNOSIS — I10 ESSENTIAL HYPERTENSION: ICD-10-CM

## 2020-12-01 RX ORDER — LISINOPRIL 10 MG/1
TABLET ORAL
Qty: 90 TABLET | Refills: 2 | Status: SHIPPED | OUTPATIENT
Start: 2020-12-01 | End: 2021-07-03

## 2020-12-04 ENCOUNTER — TELEPHONE (OUTPATIENT)
Dept: INTERNAL MEDICINE CLINIC | Facility: CLINIC | Age: 55
End: 2020-12-04

## 2020-12-04 NOTE — TELEPHONE ENCOUNTER
Patient states she has not been feeling well and would like orders for complete blood work  She will then make an appointment to discuss results      Send blood work orders to [facility] #: [phone]    Please notify patient when orders are sent  [phone]

## 2020-12-07 ENCOUNTER — OFFICE VISIT (OUTPATIENT)
Dept: INTERNAL MEDICINE CLINIC | Facility: CLINIC | Age: 55
End: 2020-12-07
Payer: COMMERCIAL

## 2020-12-07 VITALS
DIASTOLIC BLOOD PRESSURE: 80 MMHG | OXYGEN SATURATION: 95 % | SYSTOLIC BLOOD PRESSURE: 130 MMHG | WEIGHT: 175 LBS | HEIGHT: 65 IN | TEMPERATURE: 98.1 F | HEART RATE: 62 BPM | BODY MASS INDEX: 29.16 KG/M2

## 2020-12-07 DIAGNOSIS — R73.01 IMPAIRED FASTING GLUCOSE: ICD-10-CM

## 2020-12-07 DIAGNOSIS — K90.9 MALABSORPTION SYNDROME: Primary | ICD-10-CM

## 2020-12-07 DIAGNOSIS — E03.9 ACQUIRED HYPOTHYROIDISM: ICD-10-CM

## 2020-12-07 PROCEDURE — 99213 OFFICE O/P EST LOW 20 MIN: CPT | Performed by: INTERNAL MEDICINE

## 2020-12-07 RX ORDER — BUPROPION HYDROCHLORIDE 150 MG/1
150 TABLET ORAL DAILY
COMMUNITY

## 2020-12-10 LAB
ALBUMIN SERPL-MCNC: 4.1 G/DL (ref 3.6–5.1)
ALBUMIN/CREAT UR: 3 MCG/MG CREAT
ALBUMIN/GLOB SERPL: 1.7 (CALC) (ref 1–2.5)
ALP SERPL-CCNC: 124 U/L (ref 37–153)
ALT SERPL-CCNC: 17 U/L (ref 6–29)
APPEARANCE UR: CLEAR
AST SERPL-CCNC: 17 U/L (ref 10–35)
BACTERIA UR QL AUTO: ABNORMAL /HPF
BASOPHILS # BLD AUTO: 29 CELLS/UL (ref 0–200)
BASOPHILS NFR BLD AUTO: 0.7 %
BILIRUB SERPL-MCNC: 1.3 MG/DL (ref 0.2–1.2)
BILIRUB UR QL STRIP: NEGATIVE
BUN SERPL-MCNC: 8 MG/DL (ref 7–25)
BUN/CREAT SERPL: ABNORMAL (CALC) (ref 6–22)
CALCIUM SERPL-MCNC: 9.7 MG/DL (ref 8.6–10.4)
CHLORIDE SERPL-SCNC: 105 MMOL/L (ref 98–110)
CHOLEST SERPL-MCNC: 187 MG/DL
CHOLEST/HDLC SERPL: 3.8 (CALC)
CO2 SERPL-SCNC: 28 MMOL/L (ref 20–32)
COLOR UR: YELLOW
CREAT SERPL-MCNC: 0.79 MG/DL (ref 0.5–1.05)
CREAT UR-MCNC: 67 MG/DL (ref 20–275)
EOSINOPHIL # BLD AUTO: 21 CELLS/UL (ref 15–500)
EOSINOPHIL NFR BLD AUTO: 0.5 %
ERYTHROCYTE [DISTWIDTH] IN BLOOD BY AUTOMATED COUNT: 12.6 % (ref 11–15)
GLOBULIN SER CALC-MCNC: 2.4 G/DL (CALC) (ref 1.9–3.7)
GLUCOSE SERPL-MCNC: 94 MG/DL (ref 65–99)
GLUCOSE UR QL STRIP: NEGATIVE
HBA1C MFR BLD: 5.1 % OF TOTAL HGB
HCT VFR BLD AUTO: 44.3 % (ref 35–45)
HCV AB S/CO SERPL IA: 0.01
HCV AB SERPL QL IA: NORMAL
HDLC SERPL-MCNC: 49 MG/DL
HGB BLD-MCNC: 14.5 G/DL (ref 11.7–15.5)
HGB UR QL STRIP: ABNORMAL
HYALINE CASTS #/AREA URNS LPF: ABNORMAL /LPF
IRON SERPL-MCNC: 135 MCG/DL (ref 45–160)
KETONES UR QL STRIP: NEGATIVE
LDLC SERPL CALC-MCNC: 121 MG/DL (CALC)
LEUKOCYTE ESTERASE UR QL STRIP: NEGATIVE
LYMPHOCYTES # BLD AUTO: 1147 CELLS/UL (ref 850–3900)
LYMPHOCYTES NFR BLD AUTO: 27.3 %
MCH RBC QN AUTO: 28.5 PG (ref 27–33)
MCHC RBC AUTO-ENTMCNC: 32.7 G/DL (ref 32–36)
MCV RBC AUTO: 87.2 FL (ref 80–100)
MICROALBUMIN UR-MCNC: 0.2 MG/DL
MONOCYTES # BLD AUTO: 277 CELLS/UL (ref 200–950)
MONOCYTES NFR BLD AUTO: 6.6 %
NEUTROPHILS # BLD AUTO: 2726 CELLS/UL (ref 1500–7800)
NEUTROPHILS NFR BLD AUTO: 64.9 %
NITRITE UR QL STRIP: NEGATIVE
NONHDLC SERPL-MCNC: 138 MG/DL (CALC)
PH UR STRIP: 6 [PH] (ref 5–8)
PLATELET # BLD AUTO: 218 THOUSAND/UL (ref 140–400)
PMV BLD REES-ECKER: 10.9 FL (ref 7.5–12.5)
POTASSIUM SERPL-SCNC: 4.1 MMOL/L (ref 3.5–5.3)
PROT SERPL-MCNC: 6.5 G/DL (ref 6.1–8.1)
PROT UR QL STRIP: NEGATIVE
RBC # BLD AUTO: 5.08 MILLION/UL (ref 3.8–5.1)
RBC #/AREA URNS HPF: ABNORMAL /HPF
SL AMB EGFR AFRICAN AMERICAN: 98 ML/MIN/1.73M2
SL AMB EGFR NON AFRICAN AMERICAN: 84 ML/MIN/1.73M2
SODIUM SERPL-SCNC: 140 MMOL/L (ref 135–146)
SP GR UR STRIP: 1.01 (ref 1–1.03)
SQUAMOUS #/AREA URNS HPF: ABNORMAL /HPF
TRIGL SERPL-MCNC: 71 MG/DL
TSH SERPL-ACNC: 1.37 MIU/L
VIT B12 SERPL-MCNC: 564 PG/ML (ref 200–1100)
WBC # BLD AUTO: 4.2 THOUSAND/UL (ref 3.8–10.8)
WBC #/AREA URNS HPF: ABNORMAL /HPF

## 2020-12-11 ENCOUNTER — TELEPHONE (OUTPATIENT)
Dept: INTERNAL MEDICINE CLINIC | Facility: CLINIC | Age: 55
End: 2020-12-11

## 2020-12-21 DIAGNOSIS — E55.9 VITAMIN D DEFICIENCY: ICD-10-CM

## 2020-12-21 DIAGNOSIS — D50.8 IRON DEFICIENCY ANEMIA SECONDARY TO INADEQUATE DIETARY IRON INTAKE: ICD-10-CM

## 2020-12-21 DIAGNOSIS — K90.9 MALABSORPTION SYNDROME: ICD-10-CM

## 2020-12-21 RX ORDER — ERGOCALCIFEROL 1.25 MG/1
CAPSULE ORAL
Qty: 12 CAPSULE | Refills: 3 | Status: SHIPPED | OUTPATIENT
Start: 2020-12-21 | End: 2021-02-08 | Stop reason: SDUPTHER

## 2021-01-30 NOTE — PATIENT INSTRUCTIONS
Included in your paperwork today is a handout regarding Feraheme  Please call if you have any questions  We will see you back in the office in approximately 3 months with blood work prior to the appointment  Blood work is included with your paperwork today 
No

## 2021-02-08 ENCOUNTER — OFFICE VISIT (OUTPATIENT)
Dept: INTERNAL MEDICINE CLINIC | Facility: CLINIC | Age: 56
End: 2021-02-08
Payer: COMMERCIAL

## 2021-02-08 VITALS
WEIGHT: 171.8 LBS | TEMPERATURE: 98.3 F | BODY MASS INDEX: 28.62 KG/M2 | HEART RATE: 74 BPM | SYSTOLIC BLOOD PRESSURE: 124 MMHG | HEIGHT: 65 IN | DIASTOLIC BLOOD PRESSURE: 88 MMHG

## 2021-02-08 DIAGNOSIS — E03.9 ACQUIRED HYPOTHYROIDISM: ICD-10-CM

## 2021-02-08 DIAGNOSIS — K90.9 MALABSORPTION SYNDROME: ICD-10-CM

## 2021-02-08 DIAGNOSIS — F33.40 RECURRENT MAJOR DEPRESSIVE DISORDER, IN REMISSION (HCC): ICD-10-CM

## 2021-02-08 DIAGNOSIS — E55.9 VITAMIN D DEFICIENCY: ICD-10-CM

## 2021-02-08 DIAGNOSIS — D50.8 IRON DEFICIENCY ANEMIA SECONDARY TO INADEQUATE DIETARY IRON INTAKE: ICD-10-CM

## 2021-02-08 DIAGNOSIS — I10 ESSENTIAL HYPERTENSION: Primary | ICD-10-CM

## 2021-02-08 PROCEDURE — 99214 OFFICE O/P EST MOD 30 MIN: CPT | Performed by: INTERNAL MEDICINE

## 2021-02-08 RX ORDER — ESTRADIOL 0.1 MG/G
CREAM VAGINAL
COMMUNITY
Start: 2021-01-14 | End: 2022-02-16

## 2021-02-08 RX ORDER — ERGOCALCIFEROL 1.25 MG/1
50000 CAPSULE ORAL WEEKLY
Qty: 12 CAPSULE | Refills: 3 | Status: SHIPPED | OUTPATIENT
Start: 2021-02-08 | End: 2021-02-09 | Stop reason: SDUPTHER

## 2021-02-08 RX ORDER — BUPROPION HYDROCHLORIDE 150 MG/1
TABLET, EXTENDED RELEASE ORAL
COMMUNITY
Start: 2020-12-14 | End: 2021-02-08

## 2021-02-08 NOTE — PROGRESS NOTES
Assessment/Plan:       Diagnoses and all orders for this visit:    Essential hypertension    Iron deficiency anemia secondary to inadequate dietary iron intake  -     ergocalciferol (VITAMIN D2) 50,000 units; Take 1 capsule (50,000 Units total) by mouth once a week    Vitamin D deficiency  -     ergocalciferol (VITAMIN D2) 50,000 units; Take 1 capsule (50,000 Units total) by mouth once a week    Malabsorption syndrome  -     ergocalciferol (VITAMIN D2) 50,000 units; Take 1 capsule (50,000 Units total) by mouth once a week    Acquired hypothyroidism    Recurrent major [facility] )    Other orders  -     Ergocalciferol (VITAMIN D2 PO); take 1 capsule by mouth every week  -     Discontinue: buPROPion (WELLBUTRIN SR) 150 mg 12 hr tablet  -     estradiol (ESTRACE) 0 1 mg/g vaginal cream; Insert 2g daily for two weeks, then 1g daily for two weeks, than 1g two - three times per week                Subjective:      Patient ID: Lexy Del Rosario is a 54 y o  female  49-year-old woman  A history Annita-en-Y  She gained some weight and placed herself on intermittent fasting regime to try to lose the weight  She has lost 50 lb  Prior history :   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  The patient has iron deficiency and required intravenous iron  Ice craving was cured although she still has some fatigue  Vitamin-D deficiency being repleted with oral vitamin-D and will need to be recheck   Her principal issue in life is a major depressive disorder  This is rooted in a series of life events  Felony conviction for social security fraud 6 years ago; the patient apparently was receiving social security disability payments, even though she was not really eligible  Chronic pain from fibromyalgia    Hypertensive treated with lisinopril  Hypo thyroid on replacement  She was unable to get medication for quite a while and only recently this was given to her again      Hospitalized about 5 years ago for acute pancreatitis  This was felt to be due to using doxycycline in an attempt to treat the chronic pain as some kind of occult Lyme disease  The attempt failed completely and she was hospitalized with pancreatitis which took a while to resolve    Annita-en-Y gastric bypass surgery done in  for maximum weight 260 lb  Complication of care was development of portal vein thrombosis  This required 7 years of anticoagulation  Essentially, no one was willing to take the risk to have her discontinue until almost   Off Coumadin since then with no recurrence of any other clot but portal vein thrombosis was noted during her hospitalization for pancreatitis      Surgical history of bilateral hip replacement, cholecystectomy, 2  sections, and Annita-en-Y  Continued morbid obesity with slow but steady weight increase  However, she apparently has her 1st this with intermittent fasting  She would get on the scale but she self reports her weight at 191  She sees a therapist who in turn is supervised by a psychiatrist     She had seen a pain management physician for chronic low back pain but that physician thinks that she either has to live with her pain     Mammogram and colonoscopy are up today  , living up in [place]  2 children both college age  Not sexually active for many years  As a part of her multiple diagnoses she has complete loss of libido  She recognizes this but does not know what to do about it  Her therapist and psychiatrist or attempting to alleviate the situation by weaning her off Zoloft and switch her to bupropion  [employer] patrol    Smokeless cigarettes, rare alcohol, no illicit drugs  Father  from stroke with ASVD  Mother with hypertension hyperlipidemia    Also, history of aortic valve replacement due to rheumatic disease        The following portions of the patient's history were reviewed and updated as appropriate:   She has a past medical history of Anxiety, Arthritis, Depression, Fibromyalgia, primary, H/O  section, History of gastric surgery, History of total hip replacement, Hypertension, Hypothyroidism, and Portal vein thrombosis  ,  does not have any pertinent problems on file  ,   has a past surgical history that includes Gastric bypass; Joint replacement (Bilateral); Colonoscopy; pr lap,cholecystectomy/graph (N/A, 2017); pr esophagogastroduodenoscopy transoral diagnostic (N/A, 2017);  section; Hip surgery (Bilateral); Cholecystectomy; and [place] tooth extraction  ,  family history includes Cervical cancer in her mother; Coronary artery disease in her father; Heart disease in her father; Hypertension in her brother, brother, brother, father, and mother; Other in her mother; Stroke in her father  ,   reports that she quit smoking about 8 years ago  Her smoking use included e-cigarettes  She has never used smokeless tobacco  She reports previous alcohol use  She reports previous drug use  Drug: Marijuana  ,  has No Known Allergies     Current Outpatient Medications   Medication Sig Dispense Refill    ALPRAZolam (XANAX) 1 mg tablet Take 1 mg by mouth as needed for anxiety        Brexpiprazole (REXULTI) 2 MG tablet Take 2 mg by mouth daily      buPROPion (WELLBUTRIN XL) 150 mg 24 hr tablet Take 150 mg by mouth daily      cariprazine (Vraylar) 1 5 MG capsule Take 1 5 mg by mouth daily      ergocalciferol (VITAMIN D2) 50,000 units Take 1 capsule (50,000 Units total) by mouth once a week 12 capsule 3    estradiol (ESTRACE) 0 1 mg/g vaginal cream Insert 2g daily for two weeks, then 1g daily for two weeks, than 1g two - three times per week      Levomilnacipran HCl ER (FETZIMA) 40 MG extended release capsule Take 40 mg by mouth daily      lisinopril (ZESTRIL) 10 mg tablet TAKE 1 TABLET DAILY 90 tablet 2    Synthroid 75 MCG tablet TAKE 1 TABLET DAILY 90 tablet 2    traZODone (DESYREL) 50 mg tablet       Ergocalciferol (VITAMIN D2 PO) take 1 capsule by mouth every week       No current facility-administered medications for this visit  Review of Systems   Constitutional: Negative for chills and fever  HENT: Negative for sore throat and trouble swallowing  Eyes: Negative for pain  Respiratory: Negative for cough, shortness of breath and wheezing  Cardiovascular: Negative for chest pain and leg swelling  Gastrointestinal: Negative for abdominal pain, diarrhea, nausea and vomiting  Endocrine: Negative for cold intolerance and heat intolerance  Genitourinary: Negative for dysuria, frequency and pelvic pain  Musculoskeletal: Positive for arthralgias  Negative for joint swelling  Skin: Negative for rash and wound  Allergic/Immunologic: Negative for immunocompromised state  Neurological: Negative for dizziness, seizures, syncope and headaches  Psychiatric/Behavioral: Positive for dysphoric mood  The patient is not nervous/anxious  Objective:  Vitals:    02/08/21 1421   BP: 124/88   Pulse: 74   Temp: 98 3 °F (36 8 °C)      Physical Exam  Constitutional:       Appearance: She is well-developed  HENT:      Head: Normocephalic and atraumatic  Eyes:      Pupils: Pupils are equal, round, and reactive to light  Neck:      Musculoskeletal: Normal range of motion and neck supple  Thyroid: No thyromegaly  Trachea: No tracheal deviation  Cardiovascular:      Rate and Rhythm: Normal rate and regular rhythm  Heart sounds: Normal heart sounds  No murmur  No gallop  Pulmonary:      Effort: No respiratory distress  Breath sounds: No wheezing or rales  Abdominal:      General: Bowel sounds are normal       Palpations: Abdomen is soft  Tenderness: There is no abdominal tenderness  Musculoskeletal: Normal range of motion  General: No tenderness or deformity  Skin:     General: Skin is warm     Neurological:      Mental Status: She is alert and oriented to person, place, and time  Coordination: Coordination normal    Psychiatric:         Judgment: Judgment normal            Patient Instructions    Multiple issues but doing well overall  No changes  Laboratory testing      See me in a year

## 2021-02-09 DIAGNOSIS — D50.8 IRON DEFICIENCY ANEMIA SECONDARY TO INADEQUATE DIETARY IRON INTAKE: ICD-10-CM

## 2021-02-09 DIAGNOSIS — K90.9 MALABSORPTION SYNDROME: ICD-10-CM

## 2021-02-09 DIAGNOSIS — E55.9 VITAMIN D DEFICIENCY: ICD-10-CM

## 2021-02-09 RX ORDER — ERGOCALCIFEROL 1.25 MG/1
50000 CAPSULE ORAL WEEKLY
Qty: 12 CAPSULE | Refills: 3 | Status: SHIPPED | OUTPATIENT
Start: 2021-02-09 | End: 2022-01-11

## 2021-02-09 NOTE — TELEPHONE ENCOUNTER
[facility] called the patient and the Rx they do not cover it    Pt would like this to be sent to   [facility]/pharmacy #[id number]- [place], [place] - [address]     90 day supply  ergocalciferol (VITAMIN D2) 50,000 units

## 2021-03-29 ENCOUNTER — IMMUNIZATIONS (OUTPATIENT)
Dept: FAMILY MEDICINE CLINIC | Facility: HOSPITAL | Age: 56
End: 2021-03-29

## 2021-03-29 DIAGNOSIS — Z23 ENCOUNTER FOR IMMUNIZATION: Primary | ICD-10-CM

## 2021-03-29 PROCEDURE — 91300 SARS-COV-2 / COVID-19 MRNA VACCINE (PFIZER-BIONTECH) 30 MCG: CPT

## 2021-03-29 PROCEDURE — 0001A SARS-COV-2 / COVID-19 MRNA VACCINE (PFIZER-BIONTECH) 30 MCG: CPT

## 2021-04-22 ENCOUNTER — IMMUNIZATIONS (OUTPATIENT)
Dept: FAMILY MEDICINE CLINIC | Facility: HOSPITAL | Age: 56
End: 2021-04-22

## 2021-04-22 DIAGNOSIS — Z23 ENCOUNTER FOR IMMUNIZATION: Primary | ICD-10-CM

## 2021-04-22 PROCEDURE — 91300 SARS-COV-2 / COVID-19 MRNA VACCINE (PFIZER-BIONTECH) 30 MCG: CPT

## 2021-04-22 PROCEDURE — 0002A SARS-COV-2 / COVID-19 MRNA VACCINE (PFIZER-BIONTECH) 30 MCG: CPT

## 2021-07-03 DIAGNOSIS — I10 ESSENTIAL HYPERTENSION: ICD-10-CM

## 2021-07-03 RX ORDER — LISINOPRIL 10 MG/1
TABLET ORAL
Qty: 90 TABLET | Refills: 2 | Status: SHIPPED | OUTPATIENT
Start: 2021-07-03 | End: 2022-02-01

## 2022-01-11 DIAGNOSIS — D50.8 IRON DEFICIENCY ANEMIA SECONDARY TO INADEQUATE DIETARY IRON INTAKE: ICD-10-CM

## 2022-01-11 DIAGNOSIS — E55.9 VITAMIN D DEFICIENCY: ICD-10-CM

## 2022-01-11 DIAGNOSIS — K90.9 MALABSORPTION SYNDROME: ICD-10-CM

## 2022-01-11 RX ORDER — ERGOCALCIFEROL 1.25 MG/1
50000 CAPSULE ORAL WEEKLY
Qty: 12 CAPSULE | Refills: 0 | Status: SHIPPED | OUTPATIENT
Start: 2022-01-11 | End: 2022-02-14

## 2022-01-11 RX ORDER — ERGOCALCIFEROL 1.25 MG/1
CAPSULE ORAL
Qty: 12 CAPSULE | Refills: 0 | Status: SHIPPED | OUTPATIENT
Start: 2022-01-11 | End: 2022-01-11 | Stop reason: SDUPTHER

## 2022-01-11 NOTE — TELEPHONE ENCOUNTER
Transmission to pharmacy in progress error occurred  Medication resent to pharmacy  Receipt confirmed by pharmacy

## 2022-02-01 DIAGNOSIS — I10 ESSENTIAL HYPERTENSION: ICD-10-CM

## 2022-02-01 RX ORDER — LISINOPRIL 10 MG/1
TABLET ORAL
Qty: 90 TABLET | Refills: 2 | Status: SHIPPED | OUTPATIENT
Start: 2022-02-01

## 2022-02-01 RX ORDER — LEVOTHYROXINE SODIUM 75 MCG
TABLET ORAL
Qty: 90 TABLET | Refills: 2 | Status: SHIPPED | OUTPATIENT
Start: 2022-02-01

## 2022-02-14 DIAGNOSIS — D50.8 IRON DEFICIENCY ANEMIA SECONDARY TO INADEQUATE DIETARY IRON INTAKE: ICD-10-CM

## 2022-02-14 DIAGNOSIS — E55.9 VITAMIN D DEFICIENCY: ICD-10-CM

## 2022-02-14 DIAGNOSIS — K90.9 MALABSORPTION SYNDROME: ICD-10-CM

## 2022-02-14 RX ORDER — ERGOCALCIFEROL 1.25 MG/1
CAPSULE ORAL
Qty: 12 CAPSULE | Refills: 0 | Status: SHIPPED | OUTPATIENT
Start: 2022-02-14 | End: 2022-03-21

## 2022-02-16 ENCOUNTER — APPOINTMENT (OUTPATIENT)
Dept: LAB | Facility: CLINIC | Age: 57
End: 2022-02-16
Payer: COMMERCIAL

## 2022-02-16 ENCOUNTER — OFFICE VISIT (OUTPATIENT)
Dept: INTERNAL MEDICINE CLINIC | Facility: CLINIC | Age: 57
End: 2022-02-16
Payer: COMMERCIAL

## 2022-02-16 VITALS
WEIGHT: 159.8 LBS | OXYGEN SATURATION: 99 % | SYSTOLIC BLOOD PRESSURE: 120 MMHG | BODY MASS INDEX: 26.62 KG/M2 | HEART RATE: 69 BPM | DIASTOLIC BLOOD PRESSURE: 74 MMHG | HEIGHT: 65 IN | TEMPERATURE: 97.9 F

## 2022-02-16 DIAGNOSIS — E03.9 ACQUIRED HYPOTHYROIDISM: ICD-10-CM

## 2022-02-16 DIAGNOSIS — I10 PRIMARY HYPERTENSION: ICD-10-CM

## 2022-02-16 DIAGNOSIS — F33.40 RECURRENT MAJOR DEPRESSIVE DISORDER, IN REMISSION (HCC): ICD-10-CM

## 2022-02-16 DIAGNOSIS — L60.3 DYSTROPHIC NAIL: Primary | ICD-10-CM

## 2022-02-16 DIAGNOSIS — Z12.31 ENCOUNTER FOR SCREENING MAMMOGRAM FOR BREAST CANCER: ICD-10-CM

## 2022-02-16 DIAGNOSIS — R74.8 ALKALINE PHOSPHATASE ELEVATION: ICD-10-CM

## 2022-02-16 DIAGNOSIS — Z11.4 SCREENING FOR HIV (HUMAN IMMUNODEFICIENCY VIRUS): ICD-10-CM

## 2022-02-16 DIAGNOSIS — Z23 ENCOUNTER FOR IMMUNIZATION: ICD-10-CM

## 2022-02-16 DIAGNOSIS — R05.9 COUGH: ICD-10-CM

## 2022-02-16 DIAGNOSIS — Z00.00 HEALTHCARE MAINTENANCE: ICD-10-CM

## 2022-02-16 DIAGNOSIS — B35.1 ONYCHOMYCOSIS: ICD-10-CM

## 2022-02-16 DIAGNOSIS — Z12.4 SCREENING FOR CERVICAL CANCER: ICD-10-CM

## 2022-02-16 PROBLEM — E16.2 HYPOGLYCEMIA: Status: ACTIVE | Noted: 2022-02-16

## 2022-02-16 LAB
ALBUMIN SERPL BCP-MCNC: 3.8 G/DL (ref 3.5–5)
ALP SERPL-CCNC: 149 U/L (ref 46–116)
ALT SERPL W P-5'-P-CCNC: 66 U/L (ref 12–78)
ANION GAP SERPL CALCULATED.3IONS-SCNC: 4 MMOL/L (ref 4–13)
AST SERPL W P-5'-P-CCNC: 30 U/L (ref 5–45)
BACTERIA UR QL AUTO: NORMAL /HPF
BASOPHILS # BLD AUTO: 0.03 THOUSANDS/ΜL (ref 0–0.1)
BASOPHILS NFR BLD AUTO: 1 % (ref 0–1)
BILIRUB SERPL-MCNC: 0.74 MG/DL (ref 0.2–1)
BILIRUB UR QL STRIP: NEGATIVE
BUN SERPL-MCNC: 11 MG/DL (ref 5–25)
CALCIUM SERPL-MCNC: 9.7 MG/DL (ref 8.3–10.1)
CHLORIDE SERPL-SCNC: 109 MMOL/L (ref 100–108)
CHOLEST SERPL-MCNC: 190 MG/DL
CLARITY UR: CLEAR
CO2 SERPL-SCNC: 28 MMOL/L (ref 21–32)
COLOR UR: YELLOW
CREAT SERPL-MCNC: 0.71 MG/DL (ref 0.6–1.3)
EOSINOPHIL # BLD AUTO: 0.09 THOUSAND/ΜL (ref 0–0.61)
EOSINOPHIL NFR BLD AUTO: 2 % (ref 0–6)
ERYTHROCYTE [DISTWIDTH] IN BLOOD BY AUTOMATED COUNT: 12.7 % (ref 11.6–15.1)
EST. AVERAGE GLUCOSE BLD GHB EST-MCNC: 97 MG/DL
GFR SERPL CREATININE-BSD FRML MDRD: 95 ML/MIN/1.73SQ M
GLUCOSE SERPL-MCNC: 92 MG/DL (ref 65–140)
GLUCOSE UR STRIP-MCNC: NEGATIVE MG/DL
HBA1C MFR BLD: 5 %
HCT VFR BLD AUTO: 42.3 % (ref 34.8–46.1)
HDLC SERPL-MCNC: 57 MG/DL
HGB BLD-MCNC: 13.4 G/DL (ref 11.5–15.4)
HGB UR QL STRIP.AUTO: NORMAL
HYALINE CASTS #/AREA URNS LPF: NORMAL /LPF
IMM GRANULOCYTES # BLD AUTO: 0.02 THOUSAND/UL (ref 0–0.2)
IMM GRANULOCYTES NFR BLD AUTO: 0 % (ref 0–2)
KETONES UR STRIP-MCNC: NEGATIVE MG/DL
LDLC SERPL CALC-MCNC: 118 MG/DL (ref 0–100)
LEUKOCYTE ESTERASE UR QL STRIP: NEGATIVE
LYMPHOCYTES # BLD AUTO: 1.6 THOUSANDS/ΜL (ref 0.6–4.47)
LYMPHOCYTES NFR BLD AUTO: 33 % (ref 14–44)
MCH RBC QN AUTO: 29 PG (ref 26.8–34.3)
MCHC RBC AUTO-ENTMCNC: 31.7 G/DL (ref 31.4–37.4)
MCV RBC AUTO: 92 FL (ref 82–98)
MONOCYTES # BLD AUTO: 0.38 THOUSAND/ΜL (ref 0.17–1.22)
MONOCYTES NFR BLD AUTO: 8 % (ref 4–12)
NEUTROPHILS # BLD AUTO: 2.8 THOUSANDS/ΜL (ref 1.85–7.62)
NEUTS SEG NFR BLD AUTO: 56 % (ref 43–75)
NITRITE UR QL STRIP: NEGATIVE
NON-SQ EPI CELLS URNS QL MICRO: NORMAL /HPF
NRBC BLD AUTO-RTO: 0 /100 WBCS
PH UR STRIP.AUTO: 6 [PH]
PLATELET # BLD AUTO: 203 THOUSANDS/UL (ref 149–390)
PMV BLD AUTO: 10.1 FL (ref 8.9–12.7)
POTASSIUM SERPL-SCNC: 4 MMOL/L (ref 3.5–5.3)
PROT SERPL-MCNC: 7.5 G/DL (ref 6.4–8.2)
PROT UR STRIP-MCNC: NEGATIVE MG/DL
RBC # BLD AUTO: 4.62 MILLION/UL (ref 3.81–5.12)
RBC #/AREA URNS AUTO: NORMAL /HPF
SODIUM SERPL-SCNC: 141 MMOL/L (ref 136–145)
SP GR UR STRIP.AUTO: 1.01 (ref 1–1.03)
TRIGL SERPL-MCNC: 75 MG/DL
TSH SERPL DL<=0.05 MIU/L-ACNC: 1.59 UIU/ML (ref 0.36–3.74)
UROBILINOGEN UR QL STRIP.AUTO: 0.2 E.U./DL
WBC # BLD AUTO: 4.92 THOUSAND/UL (ref 4.31–10.16)
WBC #/AREA URNS AUTO: NORMAL /HPF

## 2022-02-16 PROCEDURE — 36415 COLL VENOUS BLD VENIPUNCTURE: CPT

## 2022-02-16 PROCEDURE — U0003 INFECTIOUS AGENT DETECTION BY NUCLEIC ACID (DNA OR RNA); SEVERE ACUTE RESPIRATORY SYNDROME CORONAVIRUS 2 (SARS-COV-2) (CORONAVIRUS DISEASE [COVID-19]), AMPLIFIED PROBE TECHNIQUE, MAKING USE OF HIGH THROUGHPUT TECHNOLOGIES AS DESCRIBED BY CMS-2020-01-R: HCPCS | Performed by: INTERNAL MEDICINE

## 2022-02-16 PROCEDURE — 80053 COMPREHEN METABOLIC PANEL: CPT

## 2022-02-16 PROCEDURE — U0005 INFEC AGEN DETEC AMPLI PROBE: HCPCS | Performed by: INTERNAL MEDICINE

## 2022-02-16 PROCEDURE — 82977 ASSAY OF GGT: CPT

## 2022-02-16 PROCEDURE — 99214 OFFICE O/P EST MOD 30 MIN: CPT | Performed by: INTERNAL MEDICINE

## 2022-02-16 PROCEDURE — 81001 URINALYSIS AUTO W/SCOPE: CPT | Performed by: INTERNAL MEDICINE

## 2022-02-16 PROCEDURE — 83036 HEMOGLOBIN GLYCOSYLATED A1C: CPT

## 2022-02-16 PROCEDURE — 80061 LIPID PANEL: CPT

## 2022-02-16 PROCEDURE — 85025 COMPLETE CBC W/AUTO DIFF WBC: CPT

## 2022-02-16 PROCEDURE — 87389 HIV-1 AG W/HIV-1&-2 AB AG IA: CPT

## 2022-02-16 PROCEDURE — 84443 ASSAY THYROID STIM HORMONE: CPT

## 2022-02-16 RX ORDER — ZOLPIDEM TARTRATE 10 MG/1
TABLET ORAL
COMMUNITY
Start: 2022-01-18

## 2022-02-16 RX ORDER — TERBINAFINE HYDROCHLORIDE 250 MG/1
250 TABLET ORAL DAILY
Qty: 30 TABLET | Refills: 5 | Status: SHIPPED | OUTPATIENT
Start: 2022-02-16 | End: 2022-08-03

## 2022-02-16 NOTE — PROGRESS NOTES
Assessment/Plan:       Diagnoses and all orders for this visit:    Dystrophic nail    Cough  -     COVID Only- Office Collect    Primary hypertension    Acquired hypothyroidism    Screening for HIV (human immunodeficiency virus)  -     HIV 1/2 Antigen/Antibody (4th Generation) w Reflex [facility]; Future    Screening for cervical cancer    Encounter for immunization    Encounter for screening mammogram for breast cancer  -     Mammo screening bilateral w 3d & cad; Future    Healthcare maintenance  -     Ambulatory Referral to Obstetrics / Gynecology; Future  -     Lipid Panel with Direct LDL reflex; Future  -     CBC and differential; Future  -     Hemoglobin A1C; Future  -     Comprehensive metabolic panel; Future  -     TSH, 3rd generation with Free T4 reflex; Future  -     UA (URINE) with reflex to Scope    Recurrent major depressive disorder, in remission (HCC)    Onychomycosis  -     terbinafine (LamISIL) 250 mg tablet; Take 1 tablet (250 mg total) by mouth daily    Other orders  -     zolpidem (AMBIEN) 10 mg tablet; TAKE 1 TABLET BY MOUTH AT NIGHT AS NEEDED FOR SLEEP                Subjective:      Patient ID: Chilo Isaac is a 64 y o  female  A 55-year-old woman  Cough for 1 week  Coughing is getting better  For a few days she had a fever of about 100  No wheezing no dyspnea no orthopnea no PND no fatigue  Vaccinated x3 against coronavirus  This could be COVID  Onychomycosis:  Toenails of been involved chronically  At this point she is willing to try Lamisil for 6 months with monthly LFTs     Posttraumatic dystrophy of the left index finger nail  This follows a dog bite about 2 months ago  The nail appears to be coming often there is an area where the cuticle is dark and thickened      Hypoglycemic episodes  No documentation of this with the meter but they seem pretty clear-cut; she will awaken in night with sweats and tremor and treat this with eating candy    These have been intermittent an ongoing for several years     Prior history :   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  The patient has iron deficiency and required intravenous iron  Ice craving was cured although she still has some fatigue  Vitamin-D deficiency being repleted with oral vitamin-D and will need to be recheck   Her principal issue in life is a major depressive disorder  This is rooted in a series of life events  Felony conviction for social security fraud 6 years ago; the patient apparently was receiving social security disability payments, even though she was not really eligible  Chronic pain from fibromyalgia    Hypertensive treated with lisinopril  Hypo thyroid on replacement  She was unable to get medication for quite a while and only recently this was given to her again  Hospitalized about 6 years ago for acute pancreatitis  This was felt to be due to using doxycycline in an attempt to treat the chronic pain as some kind of occult Lyme disease  The attempt failed completely and she was hospitalized with pancreatitis which took a while to resolve    Annita-en-Y gastric bypass surgery done in  for maximum weight 260 lb  Complication of care was development of portal vein thrombosis  This required 7 years of anticoagulation  Essentially, no one was willing to take the risk to have her discontinue until almost   Off Coumadin since then with no recurrence of any other clot but portal vein thrombosis was noted during her hospitalization for pancreatitis   She had gained back a lot of weight when I 1st met her but now has lost it again and is down to 160 lb  Surgical history of bilateral hip replacement, cholecystectomy, 2  sections, and Annita-en-Y  Continued morbid obesity with slow but steady weight increase  However, she apparently has her 1st this with intermittent fasting  She would get on the scale but she self reports her weight at 191      She sees a therapist who in turn is supervised by a psychiatrist     She had seen a pain management physician for chronic low back pain but that physician thinks that she either has to live with her pain     Mammogram and colonoscopy are up today  , living up in [place]  2 children both college age  Not sexually active for many years  As a part of her multiple diagnoses she has complete loss of libido  She recognizes this but does not know what to do about it  Her therapist and psychiatrist or attempting to alleviate the situation by weaning her off Zoloft and switch her to bupropion  [employer] patrol    Smokeless cigarettes, rare alcohol, no illicit drugs  Father  from stroke with ASVD  Mother with hypertension hyperlipidemia  Also, history of aortic valve replacement due to rheumatic disease        The following portions of the patient's history were reviewed and updated as appropriate:   She has a past medical history of Anxiety, Arthritis, Depression, Fibromyalgia, primary, H/O  section, History of gastric surgery, History of total hip replacement, Hypertension, Hypothyroidism, and Portal vein thrombosis  ,  does not have any pertinent problems on file  ,   has a past surgical history that includes Gastric bypass; Joint replacement (Bilateral); Colonoscopy; pr lap,cholecystectomy/graph (N/A, 2017); pr esophagogastroduodenoscopy transoral diagnostic (N/A, 2017);  section; Hip surgery (Bilateral); Cholecystectomy; and [place] tooth extraction  ,  family history includes Cervical cancer in her mother; Coronary artery disease in her father; Heart disease in her father; Hypertension in her brother, brother, brother, father, and mother; Other in her mother; Stroke in her father  ,   reports that she quit smoking about 10 years ago  Her smoking use included e-cigarettes  She has never used smokeless tobacco  She reports previous alcohol use  She reports previous drug use  Drug: Marijuana  ,  has No Known Allergies     Current Outpatient Medications   Medication Sig Dispense Refill    buPROPion (WELLBUTRIN XL) 150 mg 24 hr tablet Take 150 mg by mouth daily      cariprazine (Vraylar) 1 5 MG capsule Take 1 5 mg by mouth daily      Ergocalciferol (VITAMIN D2 PO) take 1 capsule by mouth every week      ergocalciferol (VITAMIN D2) 50,000 units TAKE 1 CAPSULE BY MOUTH ONE TIME PER WEEK 12 capsule 0    Levomilnacipran HCl ER (FETZIMA) 40 MG extended release capsule Take 40 mg by mouth daily      lisinopril (ZESTRIL) 10 mg tablet TAKE 1 TABLET DAILY 90 tablet 2    Synthroid 75 MCG tablet TAKE 1 TABLET DAILY 90 tablet 2    zolpidem (AMBIEN) 10 mg tablet TAKE 1 TABLET BY MOUTH AT NIGHT AS NEEDED FOR SLEEP      terbinafine (LamISIL) 250 mg tablet Take 1 tablet (250 mg total) by mouth daily 30 tablet 5     No current facility-administered medications for this visit  Review of Systems   Respiratory: Positive for cough  Skin:        Dystrophic nail of finger     Onychomycosis of toenails  Objective:  Vitals:    02/16/22 1424   BP: 120/74   Pulse: 69   Temp: 97 9 °F (36 6 °C)   SpO2: 99%      Physical Exam  Constitutional:       Appearance: She is well-developed  HENT:      Head: Normocephalic and atraumatic  Eyes:      Pupils: Pupils are equal, round, and reactive to light  Neck:      Thyroid: No thyromegaly  Trachea: No tracheal deviation  Cardiovascular:      Rate and Rhythm: Normal rate and regular rhythm  Heart sounds: Normal heart sounds  No murmur heard  No gallop  Pulmonary:      Effort: No respiratory distress  Breath sounds: No wheezing or rales  Abdominal:      General: Bowel sounds are normal       Palpations: Abdomen is soft  Tenderness: There is no abdominal tenderness  Musculoskeletal:         General: No tenderness or deformity  Normal range of motion  Cervical back: Normal range of motion and neck supple  Skin:     General: Skin is warm  Comments: On echo mycosis of toenails in particular the right big toenail    Left index finger nail is lifting for the nail bed; medial posterior area the cuticle is black and  No tenderness and no drainage  Please refer to the photograph in the media section of the chart  Neurological:      Mental Status: She is alert and oriented to person, place, and time  Coordination: Coordination normal    Psychiatric:         Judgment: Judgment normal            Patient Instructions    A patient with the above issues   Therapeutic trial of Lamisil for onychomycosis  Call me to let me know about the progress  LFTs will be needed monthly  I would like to see you back here in 3 months  Episodes of night sweats highly suggestive of hypoglycemic episodes  Please check sugar at home using a glucometer  Cough; normal exam otherwise  Still suggestive of COVID in a completely vaccinated patient and will check a COVID swab  Further recommendations will depend upon results of these initial tests and studies

## 2022-02-16 NOTE — LETTER
February 16, 2022     Patient: Shaquille Herrera   YOB: 1965   Date of Visit: 2/16/2022       To Whom it May Concern:    Shaquille Herrera is under my professional care  She was seen in my office on 2/16/2022  If you have any questions or concerns, please don't hesitate to call           Sincerely,          Andre Mcdermott MD        CC: No Recipients

## 2022-02-16 NOTE — PATIENT INSTRUCTIONS
A patient with the above issues   Therapeutic trial of Lamisil for onychomycosis  Call me to let me know about the progress  LFTs will be needed monthly  I would like to see you back here in 3 months  Episodes of night sweats highly suggestive of hypoglycemic episodes  Please check sugar at home using a glucometer  Cough; normal exam otherwise  Still suggestive of COVID in a completely vaccinated patient and will check a COVID swab  Further recommendations will depend upon results of these initial tests and studies

## 2022-02-17 DIAGNOSIS — R05.9 COUGH: ICD-10-CM

## 2022-02-17 LAB
GGT SERPL-CCNC: 50 U/L (ref 5–85)
HIV 1+2 AB+HIV1 P24 AG SERPL QL IA: NORMAL
SARS-COV-2 RNA RESP QL NAA+PROBE: NEGATIVE

## 2022-02-17 RX ORDER — METHYLPREDNISOLONE 4 MG/1
TABLET ORAL
Qty: 21 EACH | Refills: 0 | Status: SHIPPED | OUTPATIENT
Start: 2022-02-17 | End: 2022-03-31

## 2022-02-17 RX ORDER — AZITHROMYCIN 250 MG/1
TABLET, FILM COATED ORAL
Qty: 6 TABLET | Refills: 0 | Status: SHIPPED | OUTPATIENT
Start: 2022-02-17 | End: 2022-02-21

## 2022-02-17 NOTE — TELEPHONE ENCOUNTER
----- Message from Enedina Cervantes MD sent at 2/17/2022 10:23 AM EST -----   Negative for COVID  Take a Medrol pack and a Zithromax pack for the cough  These have been sent to the pharmacy

## 2022-03-29 LAB
ALP SERPL-CCNC: 103 U/L (ref 37–153)
MITOCHONDRIA AB SER QL IF: NEGATIVE

## 2022-06-02 DIAGNOSIS — D50.8 IRON DEFICIENCY ANEMIA SECONDARY TO INADEQUATE DIETARY IRON INTAKE: ICD-10-CM

## 2022-06-02 DIAGNOSIS — E55.9 VITAMIN D DEFICIENCY: ICD-10-CM

## 2022-06-02 DIAGNOSIS — K90.9 MALABSORPTION SYNDROME: ICD-10-CM

## 2022-06-06 RX ORDER — ERGOCALCIFEROL 1.25 MG/1
CAPSULE ORAL
Qty: 4 CAPSULE | Refills: 0 | Status: SHIPPED | OUTPATIENT
Start: 2022-06-06 | End: 2022-07-03

## 2022-06-14 ENCOUNTER — TELEPHONE (OUTPATIENT)
Dept: INTERNAL MEDICINE CLINIC | Facility: CLINIC | Age: 57
End: 2022-06-14

## 2022-06-14 DIAGNOSIS — Z79.899 HIGH RISK MEDICATION USE: Primary | ICD-10-CM

## 2022-06-14 NOTE — TELEPHONE ENCOUNTER
I last alkaline phosphatase was normal and her anti mitochondrial antibody was negative this was done in March

## 2022-06-14 NOTE — TELEPHONE ENCOUNTER
[facility] is at [facility] now  She believes she needs a standing order for     Alkaline phosphatase, isoenzymes   Antimitochondrial antibody       Last time [facility] took these was 3/28  Do you want this to be a standing order?  If so please indicate    Fax # [phone]    Pt cb # [phone]

## 2022-06-14 NOTE — TELEPHONE ENCOUNTER
I spoke to pt she is aware of her results she has another question she stated that she take a anti fungal pill everyday and she said that she was told by doctor rogelio that her liver could be damage from taking this pill she is asking does she need to have a liver profile drawn periodically

## 2022-06-21 LAB
ALBUMIN SERPL-MCNC: 4.1 G/DL (ref 3.6–5.1)
ALBUMIN/GLOB SERPL: 1.9 (CALC) (ref 1–2.5)
ALP SERPL-CCNC: 92 U/L (ref 37–153)
ALT SERPL-CCNC: 18 U/L (ref 6–29)
AST SERPL-CCNC: 22 U/L (ref 10–35)
BILIRUB DIRECT SERPL-MCNC: 0.2 MG/DL
BILIRUB INDIRECT SERPL-MCNC: 0.8 MG/DL (CALC) (ref 0.2–1.2)
BILIRUB SERPL-MCNC: 1 MG/DL (ref 0.2–1.2)
GLOBULIN SER CALC-MCNC: 2.2 G/DL (CALC) (ref 1.9–3.7)
PROT SERPL-MCNC: 6.3 G/DL (ref 6.1–8.1)

## 2022-07-03 DIAGNOSIS — D50.8 IRON DEFICIENCY ANEMIA SECONDARY TO INADEQUATE DIETARY IRON INTAKE: ICD-10-CM

## 2022-07-03 DIAGNOSIS — E55.9 VITAMIN D DEFICIENCY: ICD-10-CM

## 2022-07-03 DIAGNOSIS — K90.9 MALABSORPTION SYNDROME: ICD-10-CM

## 2022-07-03 RX ORDER — ERGOCALCIFEROL 1.25 MG/1
CAPSULE ORAL
Qty: 4 CAPSULE | Refills: 0 | Status: SHIPPED | OUTPATIENT
Start: 2022-07-03 | End: 2022-08-01

## 2022-07-28 DIAGNOSIS — D50.8 IRON DEFICIENCY ANEMIA SECONDARY TO INADEQUATE DIETARY IRON INTAKE: ICD-10-CM

## 2022-07-28 DIAGNOSIS — E55.9 VITAMIN D DEFICIENCY: ICD-10-CM

## 2022-07-28 DIAGNOSIS — K90.9 MALABSORPTION SYNDROME: ICD-10-CM

## 2022-08-01 RX ORDER — ERGOCALCIFEROL 1.25 MG/1
CAPSULE ORAL
Qty: 4 CAPSULE | Refills: 0 | Status: SHIPPED | OUTPATIENT
Start: 2022-08-01 | End: 2022-08-29

## 2022-08-02 DIAGNOSIS — B35.1 ONYCHOMYCOSIS: ICD-10-CM

## 2022-08-03 RX ORDER — TERBINAFINE HYDROCHLORIDE 250 MG/1
TABLET ORAL
Qty: 30 TABLET | Refills: 5 | Status: SHIPPED | OUTPATIENT
Start: 2022-08-03 | End: 2022-09-02

## 2022-08-18 ENCOUNTER — TELEPHONE (OUTPATIENT)
Dept: INTERNAL MEDICINE CLINIC | Facility: CLINIC | Age: 57
End: 2022-08-18

## 2022-08-18 NOTE — TELEPHONE ENCOUNTER
F#: [phone]  Faxed medical records from the last yr with Dr Marichuy Zaldivar- also labs and colonoscopy report     They were Faxed to: Michael Shankar with [facility]    Has an appt today, Aug 18 Never smoker

## 2022-08-28 DIAGNOSIS — E55.9 VITAMIN D DEFICIENCY: ICD-10-CM

## 2022-08-28 DIAGNOSIS — D50.8 IRON DEFICIENCY ANEMIA SECONDARY TO INADEQUATE DIETARY IRON INTAKE: ICD-10-CM

## 2022-08-28 DIAGNOSIS — K90.9 MALABSORPTION SYNDROME: ICD-10-CM

## 2022-08-29 RX ORDER — ERGOCALCIFEROL 1.25 MG/1
CAPSULE ORAL
Qty: 4 CAPSULE | Refills: 0 | Status: SHIPPED | OUTPATIENT
Start: 2022-08-29 | End: 2022-10-07

## 2022-09-06 DIAGNOSIS — I10 ESSENTIAL HYPERTENSION: ICD-10-CM

## 2022-09-07 RX ORDER — LISINOPRIL 10 MG/1
TABLET ORAL
Qty: 90 TABLET | Refills: 2 | Status: SHIPPED | OUTPATIENT
Start: 2022-09-07

## 2022-09-15 ENCOUNTER — OFFICE VISIT (OUTPATIENT)
Dept: INTERNAL MEDICINE CLINIC | Facility: CLINIC | Age: 57
End: 2022-09-15
Payer: COMMERCIAL

## 2022-09-15 VITALS
HEART RATE: 91 BPM | SYSTOLIC BLOOD PRESSURE: 118 MMHG | BODY MASS INDEX: 27.02 KG/M2 | HEIGHT: 65 IN | RESPIRATION RATE: 17 BRPM | OXYGEN SATURATION: 99 % | WEIGHT: 162.2 LBS | TEMPERATURE: 97.5 F | DIASTOLIC BLOOD PRESSURE: 70 MMHG

## 2022-09-15 DIAGNOSIS — M19.90 ARTHRITIS: ICD-10-CM

## 2022-09-15 DIAGNOSIS — M79.7 FIBROMYALGIA, PRIMARY: ICD-10-CM

## 2022-09-15 DIAGNOSIS — Z96.643 HISTORY OF TOTAL REPLACEMENT OF BOTH HIP JOINTS: ICD-10-CM

## 2022-09-15 DIAGNOSIS — E03.9 ACQUIRED HYPOTHYROIDISM: Primary | ICD-10-CM

## 2022-09-15 DIAGNOSIS — M25.559 HIP PAIN: ICD-10-CM

## 2022-09-15 PROCEDURE — 99214 OFFICE O/P EST MOD 30 MIN: CPT | Performed by: INTERNAL MEDICINE

## 2022-09-15 RX ORDER — TERBINAFINE HYDROCHLORIDE 250 MG/1
TABLET ORAL
COMMUNITY
Start: 2022-09-07

## 2022-09-15 RX ORDER — ALPRAZOLAM 1 MG/1
TABLET ORAL
COMMUNITY
Start: 2022-08-25

## 2022-09-15 NOTE — PATIENT INSTRUCTIONS
A patient with the above history and physical   Labs are up-to-date  I will do letter   Mammogram should be done fairly soon  Revisit with me in about 6 months

## 2022-09-15 NOTE — PROGRESS NOTES
Assessment/Plan:       Diagnoses and all orders for this visit:    Acquired hypothyroidism    Hip pain  -     XR hip/pelv 1 vw left if performed; Future  -     XR hip/pelv 1 vw right if performed; Future    Fibromyalgia, primary    Arthritis    History of total replacement of both hip joints    Other orders  -     ALPRAZolam (XANAX) 1 mg tablet; TAKE 1 TABLET BY MOUTH THREE TIMES DAILY AND AN ADDITIONAL HALF TABLET IF NEEDED DAILY  -     terbinafine (LamISIL) 250 mg tablet                Subjective:      Patient ID: Ernie Reed is a 64 y o  female  59-year-old woman    Today she has some functional issues  Bilateral hip replacement  Fibromyalgia  Osteoarthritis   Back pain and anterior hip pain  She has a job working in a [employer] that involves a lot of heavy lifting, also, apparently your hip replacements set off the alarms at the job  She is asking for notes explaining these fax  X-ray of the hip can be obtained to document this   As far as her limitations, the patient describes them as being:   Lifting more than 20 lb  Continued standing no more than 10 hours a day     Onychomycosis:  6 months of Lamisil apparently did not help anything  Posttraumatic dystrophy of the left index finger nail  This has resolved      Hypoglycemic episodes  No documentation of this with the meter but they seem pretty clear-cut; she will awaken in night with sweats and tremor and treat this with eating candy  These have been intermittent an ongoing for several years  No change in this; fortunately not disabling  Prior history :   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  The patient has iron deficiency and required intravenous iron  Ice craving was cured although she still has some fatigue  Vitamin-D deficiency being repleted with oral vitamin-D and will need to be recheck   Her principal issue in life is a major depressive disorder  This is rooted in a series of life events    Felony conviction for social security fraud 6 years ago; the patient apparently was receiving social security disability payments, even though she was not really eligible  Chronic pain from fibromyalgia    Hypertensive treated with lisinopril  Hypo thyroid on replacement  Hospitalized about  6 years ago for acute pancreatitis  This was felt to be due to using doxycycline in an attempt to treat the chronic pain as some kind of occult Lyme disease  The attempt failed completely and she was hospitalized with pancreatitis which took a while to resolve    Annita-en-Y gastric bypass surgery done in  for maximum weight 260 lb  Complication of care was development of portal vein thrombosis  This required 7 years of anticoagulation  Essentially, no one was willing to take the risk to have her discontinue until almost   Off Coumadin since then with no recurrence of any other clot but portal vein thrombosis was noted during her hospitalization for pancreatitis   She had gained back a lot of weight when I 1st met her but now has lost it again and is down to 160 lb  Surgical history of bilateral hip replacement, cholecystectomy, 2  sections, and Annita-en-Y  She sees a therapist who in turn is supervised by a psychiatrist     She had seen a pain management physician for chronic low back pain but that physician thinks that she either has to live with her pain     Mammogram and colonoscopy are up today  , living up in [place]  2 children both college age  Not sexually active for many years  As a part of her multiple diagnoses she has complete loss of libido  She recognizes this but does not know what to do about it  Her therapist and psychiatrist or attempting to alleviate the situation by weaning her off Zoloft and switch her to bupropion      [employer] patrol    Smokeless cigarettes, rare alcohol, no illicit drugs  Father  from stroke with ASVD  Mother with hypertension hyperlipidemia  Also, history of aortic valve replacement due to rheumatic disease        The following portions of the patient's history were reviewed and updated as appropriate:   She has a past medical history of Anxiety, Arthritis, Depression, Fibromyalgia, primary, H/O  section, History of gastric surgery, History of total hip replacement, Hypertension, Hypothyroidism, and Portal vein thrombosis  ,  does not have any pertinent problems on file  ,   has a past surgical history that includes Gastric bypass; Joint replacement (Bilateral); Colonoscopy; pr lap,cholecystectomy/graph (N/A, 2017); pr esophagogastroduodenoscopy transoral diagnostic (N/A, 2017);  section; Hip surgery (Bilateral); Cholecystectomy; and [place] tooth extraction  ,  family history includes Cervical cancer in her mother; Coronary artery disease in her father; Heart disease in her father; Hypertension in her brother, brother, brother, father, and mother; Other in her mother; Stroke in her father  ,   reports that she quit smoking about 10 years ago  Her smoking use included e-cigarettes  She has never used smokeless tobacco  She reports previous alcohol use  She reports previous drug use  Drug: Marijuana  ,  has No Known Allergies     Current Outpatient Medications   Medication Sig Dispense Refill    buPROPion (WELLBUTRIN XL) 150 mg 24 hr tablet Take 150 mg by mouth daily      cariprazine (VRAYLAR) 1 5 MG capsule Take 1 5 mg by mouth daily      ergocalciferol (VITAMIN D2) 50,000 units TAKE 1 CAPSULE BY MOUTH ONE TIME PER WEEK 4 capsule 0    Levomilnacipran HCl ER (FETZIMA) 40 MG extended release capsule Take 40 mg by mouth daily      lisinopril (ZESTRIL) 10 mg tablet TAKE 1 TABLET DAILY 90 tablet 2    Synthroid 75 MCG tablet TAKE 1 TABLET DAILY 90 tablet 2    terbinafine (LamISIL) 250 mg tablet       zolpidem (AMBIEN) 10 mg tablet TAKE 1 TABLET BY MOUTH AT NIGHT AS NEEDED FOR SLEEP      ALPRAZolam (XANAX) 1 mg tablet TAKE 1 TABLET BY MOUTH THREE TIMES DAILY AND AN ADDITIONAL HALF TABLET IF NEEDED DAILY      Ergocalciferol (VITAMIN D2 PO) take 1 capsule by mouth every week (Patient not taking: Reported on 9/15/2022)       No current facility-administered medications for this visit  Review of Systems   Musculoskeletal: Positive for arthralgias and back pain  Skin:        Nail dystrophy   Psychiatric/Behavioral: Positive for dysphoric mood  Objective:  Vitals:    09/15/22 1341   BP: 118/70   Pulse: 91   Resp: 17   Temp: 97 5 °F (36 4 °C)   SpO2: 99%      Physical Exam      There are no Patient Instructions on file for this visit

## 2022-09-16 ENCOUNTER — TELEPHONE (OUTPATIENT)
Dept: ADMINISTRATIVE | Facility: OTHER | Age: 57
End: 2022-09-16

## 2022-09-16 NOTE — TELEPHONE ENCOUNTER
Upon review of the In Basket request we were able to locate, review, and update the patient chart as requested for Mammogram     Any additional questions or concerns should be emailed to the Practice Liaisons via [email or web address]  [email or web address] email, please do not reply via In Basket      Thank you  Mary Talavera

## 2022-09-16 NOTE — TELEPHONE ENCOUNTER
----- Message from [place], [place] sent at 9/15/2022  1:13 PM EDT -----  Regarding: MAMMOGRAM  09/15/22 1:14 PM    Hello, our patient Arabella Martinez has had Mammogram completed/performed  Please assist in updating the patient chart by pulling the Care Everywhere (CE) document  The date of service is 11/01/2021       Thank you,  Louisa Ganser, MA  [facility]  [address]

## 2022-10-11 PROBLEM — R05.9 COUGH: Status: RESOLVED | Noted: 2022-02-16 | Resolved: 2022-10-11

## 2022-10-31 DIAGNOSIS — D50.8 IRON DEFICIENCY ANEMIA SECONDARY TO INADEQUATE DIETARY IRON INTAKE: ICD-10-CM

## 2022-10-31 DIAGNOSIS — E55.9 VITAMIN D DEFICIENCY: ICD-10-CM

## 2022-10-31 DIAGNOSIS — K90.9 MALABSORPTION SYNDROME: ICD-10-CM

## 2022-10-31 RX ORDER — ERGOCALCIFEROL 1.25 MG/1
CAPSULE ORAL
Qty: 12 CAPSULE | Refills: 2 | Status: SHIPPED | OUTPATIENT
Start: 2022-10-31

## 2022-11-28 ENCOUNTER — OFFICE VISIT (OUTPATIENT)
Dept: INTERNAL MEDICINE CLINIC | Facility: CLINIC | Age: 57
End: 2022-11-28

## 2022-11-28 VITALS
SYSTOLIC BLOOD PRESSURE: 132 MMHG | RESPIRATION RATE: 18 BRPM | OXYGEN SATURATION: 97 % | TEMPERATURE: 97.6 F | BODY MASS INDEX: 26.16 KG/M2 | DIASTOLIC BLOOD PRESSURE: 74 MMHG | WEIGHT: 157.2 LBS | HEART RATE: 83 BPM

## 2022-11-28 DIAGNOSIS — E03.9 ACQUIRED HYPOTHYROIDISM: ICD-10-CM

## 2022-11-28 DIAGNOSIS — K90.9 MALABSORPTION SYNDROME: ICD-10-CM

## 2022-11-28 DIAGNOSIS — M54.50 LUMBAR PAIN: ICD-10-CM

## 2022-11-28 DIAGNOSIS — M15.9 PRIMARY OSTEOARTHRITIS INVOLVING MULTIPLE JOINTS: Primary | ICD-10-CM

## 2022-11-28 DIAGNOSIS — Z12.4 SCREENING FOR CERVICAL CANCER: ICD-10-CM

## 2022-11-28 DIAGNOSIS — Z96.643 HISTORY OF TOTAL REPLACEMENT OF BOTH HIP JOINTS: ICD-10-CM

## 2022-11-28 DIAGNOSIS — Z23 ENCOUNTER FOR IMMUNIZATION: ICD-10-CM

## 2022-11-28 DIAGNOSIS — Z12.31 ENCOUNTER FOR SCREENING MAMMOGRAM FOR BREAST CANCER: ICD-10-CM

## 2022-11-28 DIAGNOSIS — E16.2 HYPOGLYCEMIA: ICD-10-CM

## 2022-11-28 PROBLEM — M15.0 PRIMARY OSTEOARTHRITIS INVOLVING MULTIPLE JOINTS: Status: ACTIVE | Noted: 2022-11-28

## 2022-11-28 PROBLEM — G89.4 CHRONIC PAIN SYNDROME: Status: ACTIVE | Noted: 2022-11-28

## 2022-11-28 NOTE — PROGRESS NOTES
Assessment/Plan:       Diagnoses and all orders for this visit:    Primary osteoarthritis involving multiple joints    Lumbar pain    Encounter for immunization    Screening for cervical cancer    Encounter for screening mammogram for breast cancer    History of total replacement of both hip joints    Malabsorption syndrome  -     Lipid Panel with Direct LDL reflex; Future  -     CBC and differential; Future  -     Hemoglobin A1C; Future  -     Comprehensive metabolic panel; Future  -     TSH, 3rd generation with Free T4 reflex; Future  -     Cortisol Level, AM Specimen; Future  -     Folate; Future  -     Iron; Future  -     Transferrin; Future  -     Vitamin B12; Future    Acquired hypothyroidism  -     Lipid Panel with Direct LDL reflex; Future  -     CBC and differential; Future  -     Hemoglobin A1C; Future  -     Comprehensive metabolic panel; Future  -     TSH, 3rd generation with Free T4 reflex; Future  -     Cortisol Level, AM Specimen; Future  -     Folate; Future  -     Iron; Future  -     Transferrin; Future  -     Vitamin B12; Future    Hypoglycemia  -     Lipid Panel with Direct LDL reflex; Future  -     CBC and differential; Future  -     Hemoglobin A1C; Future  -     Comprehensive metabolic panel; Future  -     TSH, 3rd generation with Free T4 reflex; Future  -     Cortisol Level, AM Specimen; Future  -     Folate; Future  -     Iron; Future  -     Transferrin; Future  -     Vitamin B12; Future                Subjective:      Patient ID: Floridalma Osuna is a 62 y o  female  45-year-old woman    Multiple musculoskeletal diagnoses and complaints  Diagnoses in history are:    Bilateral hip replacement  Fibromyalgia  Osteoarthritis   Back pain and anterior hip pain  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, also, apparently your hip replacements set off the alarms at the job          Job description involves loading heavy objects into boxes to be shipped to customers  We have reviewed her job description her limitations  Based upon her history, the own limitations have been noted:  Lifting 10-25 lb 50% restriction, 50% restriction  Lifting greater than 25 lb, 100% restriction  Push pull 10-25 lb 50% restriction  Post pull greater than 25 lb 100% restriction  Bending and twisting 75% of restriction    Kneeling and squatting 25% restriction    No restriction ray climbing stairs, standing, and walking  No restriction for repetitive hand motion  No restriction for operating a vehicle  No restriction insight vision speech  Can work 40 hours a week given the above  Hypoglycemic episodes  No documentation of this with the meter but they seem pretty clear-cut; she will awaken in night with sweats and tremor and treat this with eating candy  These have been intermittent an ongoing for several years  No change in this; fortunately not disabling  Prior history :   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  The patient has iron deficiency and required intravenous iron  Ice craving was cured although she still has some fatigue  Vitamin-D deficiency being repleted with oral vitamin-D and will need to be recheck   Her principal issue in life is a major depressive disorder  This is rooted in a series of life events  Felony conviction for social security fraud 7 years ago; the patient apparently was receiving social security disability payments, even though she was not really eligible  Chronic pain from fibromyalgia    Hypertensive treated with lisinopril  Hypo thyroid on replacement  Hospitalized about  7 years ago for acute pancreatitis  This was felt to be due to using doxycycline in an attempt to treat the chronic pain as some kind of occult Lyme disease  The attempt failed completely and she was hospitalized with pancreatitis which took a while to resolve    Annita-en-Y gastric bypass surgery done in 2002 for maximum weight 260 lb    Complication of care was development of portal vein thrombosis  This required 7 years of anticoagulation  Essentially, no one was willing to take the risk to have her discontinue until almost 2010  Off Coumadin since then with no recurrence of any other clot but portal vein thrombosis was noted during her hospitalization for pancreatitis   She had gained back a lot of weight when I 1st met her but now has lost it again and is down to 160 lb  Surgical history of bilateral hip replacement, cholecystectomy, 2  sections, and Annita-en-Y  She sees a therapist who in turn is supervised by a psychiatrist     She had seen a pain management physician for chronic low back pain but that physician thinks that she either has to live with her pain     Mammogram and colonoscopy are up today  , living up in [place]  2 children both college age  Not sexually active for many years  As a part of her multiple diagnoses she has complete loss of libido  She recognizes this but does not know what to do about it  Her therapist and psychiatrist or attempting to alleviate the situation by weaning her off Zoloft and switch her to bupropion  [employer]    Smokeless cigarettes, rare alcohol, no illicit drugs  Father  from stroke with ASVD  Mother with hypertension hyperlipidemia  Also, history of aortic valve replacement due to rheumatic disease        The following portions of the patient's history were reviewed and updated as appropriate:   She has a past medical history of Anxiety, Arthritis, Depression, Fibromyalgia, primary, H/O  section, History of gastric surgery, History of total hip replacement, Hypertension, Hypothyroidism, and Portal vein thrombosis  ,  does not have any pertinent problems on file  ,   has a past surgical history that includes Gastric bypass; Joint replacement (Bilateral);  Colonoscopy; pr lap,cholecystectomy/graph (N/A, 2017); pr esophagogastroduodenoscopy transoral diagnostic (N/A, 2017);  section; Hip surgery (Bilateral); Cholecystectomy; and [place] tooth extraction  ,  family history includes Cervical cancer in her mother; Coronary artery disease in her father; Heart disease in her father; Hypertension in her brother, brother, brother, father, and mother; Other in her mother; Stroke in her father  ,   reports that she quit smoking about 10 years ago  Her smoking use included e-cigarettes  She has never used smokeless tobacco  She reports that she does not currently use alcohol  She reports that she does not currently use drugs after having used the following drugs: Marijuana  ,  has No Known Allergies     Current Outpatient Medications   Medication Sig Dispense Refill   • ALPRAZolam (XANAX) 1 mg tablet TAKE 1 TABLET BY MOUTH THREE TIMES DAILY AND AN ADDITIONAL HALF TABLET IF NEEDED DAILY     • buPROPion (WELLBUTRIN XL) 150 mg 24 hr tablet Take 150 mg by mouth daily     • cariprazine (VRAYLAR) 1 5 MG capsule Take 1 5 mg by mouth daily     • Levomilnacipran HCl ER (FETZIMA) 40 MG extended release capsule Take 40 mg by mouth daily     • lisinopril (ZESTRIL) 10 mg tablet TAKE 1 TABLET DAILY 90 tablet 2   • Synthroid 75 MCG tablet TAKE 1 TABLET DAILY 90 tablet 2   • terbinafine (LamISIL) 250 mg tablet      • zolpidem (AMBIEN) 10 mg tablet TAKE 1 TABLET BY MOUTH AT NIGHT AS NEEDED FOR SLEEP     • Ergocalciferol (VITAMIN D2 PO) take 1 capsule by mouth every week (Patient not taking: Reported on 2022)     • ergocalciferol (VITAMIN D2) 50,000 units TAKE 1 CAPSULE BY MOUTH ONE TIME PER WEEK 12 capsule 3     No current facility-administered medications for this visit  Review of Systems      Objective:  Vitals:    22 1642   BP: 132/74   Pulse: 83   Resp: 18   Temp: 97 6 °F (36 4 °C)   SpO2: 97%      Physical Exam      There are no Patient Instructions on file for this visit

## 2022-11-29 ENCOUNTER — TELEPHONE (OUTPATIENT)
Dept: INTERNAL MEDICINE CLINIC | Facility: CLINIC | Age: 57
End: 2022-11-29

## 2022-11-29 NOTE — TELEPHONE ENCOUNTER
Patient was seen yesterday had a form filled out & fax to her job  the form was faxed back to use    patients HR dept    requesting that the form    needs to be corrected  has to say    NON RESREICRED and initialed     In 3 spots  1-B  2-B  4-A

## 2022-12-09 LAB
ALBUMIN SERPL-MCNC: 4.4 G/DL (ref 3.6–5.1)
ALBUMIN/GLOB SERPL: 1.9 (CALC) (ref 1–2.5)
ALP SERPL-CCNC: 111 U/L (ref 37–153)
ALT SERPL-CCNC: 29 U/L (ref 6–29)
AST SERPL-CCNC: 30 U/L (ref 10–35)
BASOPHILS # BLD AUTO: 42 CELLS/UL (ref 0–200)
BASOPHILS NFR BLD AUTO: 1.2 %
BILIRUB SERPL-MCNC: 0.9 MG/DL (ref 0.2–1.2)
BUN SERPL-MCNC: 15 MG/DL (ref 7–25)
BUN/CREAT SERPL: ABNORMAL (CALC) (ref 6–22)
CALCIUM SERPL-MCNC: 9.8 MG/DL (ref 8.6–10.4)
CHLORIDE SERPL-SCNC: 106 MMOL/L (ref 98–110)
CHOLEST SERPL-MCNC: 178 MG/DL
CHOLEST/HDLC SERPL: 2.5 (CALC)
CO2 SERPL-SCNC: 30 MMOL/L (ref 20–32)
CORTIS AM PEAK SERPL-MCNC: 20.7 MCG/DL
CREAT SERPL-MCNC: 0.76 MG/DL (ref 0.5–1.03)
EOSINOPHIL # BLD AUTO: 42 CELLS/UL (ref 15–500)
EOSINOPHIL NFR BLD AUTO: 1.2 %
ERYTHROCYTE [DISTWIDTH] IN BLOOD BY AUTOMATED COUNT: 12.6 % (ref 11–15)
FOLATE SERPL-MCNC: >24 NG/ML
GFR/BSA.PRED SERPLBLD CYS-BASED-ARV: 91 ML/MIN/1.73M2
GLOBULIN SER CALC-MCNC: 2.3 G/DL (CALC) (ref 1.9–3.7)
GLUCOSE SERPL-MCNC: 100 MG/DL (ref 65–99)
HBA1C MFR BLD: 5 % OF TOTAL HGB
HCT VFR BLD AUTO: 43.6 % (ref 35–45)
HDLC SERPL-MCNC: 71 MG/DL
HGB BLD-MCNC: 14.3 G/DL (ref 11.7–15.5)
IRON SERPL-MCNC: 163 MCG/DL (ref 45–160)
LDLC SERPL CALC-MCNC: 93 MG/DL (CALC)
LYMPHOCYTES # BLD AUTO: 879 CELLS/UL (ref 850–3900)
LYMPHOCYTES NFR BLD AUTO: 25.1 %
MCH RBC QN AUTO: 29.2 PG (ref 27–33)
MCHC RBC AUTO-ENTMCNC: 32.8 G/DL (ref 32–36)
MCV RBC AUTO: 89.2 FL (ref 80–100)
MONOCYTES # BLD AUTO: 214 CELLS/UL (ref 200–950)
MONOCYTES NFR BLD AUTO: 6.1 %
NEUTROPHILS # BLD AUTO: 2324 CELLS/UL (ref 1500–7800)
NEUTROPHILS NFR BLD AUTO: 66.4 %
NONHDLC SERPL-MCNC: 107 MG/DL (CALC)
PLATELET # BLD AUTO: 199 THOUSAND/UL (ref 140–400)
PMV BLD REES-ECKER: 10.8 FL (ref 7.5–12.5)
POTASSIUM SERPL-SCNC: 4.2 MMOL/L (ref 3.5–5.3)
PROT SERPL-MCNC: 6.7 G/DL (ref 6.1–8.1)
RBC # BLD AUTO: 4.89 MILLION/UL (ref 3.8–5.1)
SODIUM SERPL-SCNC: 142 MMOL/L (ref 135–146)
TRANSFERRIN SERPL-MCNC: 231 MG/DL (ref 188–341)
TRIGL SERPL-MCNC: 57 MG/DL
TSH SERPL-ACNC: 1.36 MIU/L (ref 0.4–4.5)
VIT B12 SERPL-MCNC: 728 PG/ML (ref 200–1100)
WBC # BLD AUTO: 3.5 THOUSAND/UL (ref 3.8–10.8)

## 2022-12-16 DIAGNOSIS — I10 ESSENTIAL HYPERTENSION: ICD-10-CM

## 2022-12-16 RX ORDER — LEVOTHYROXINE SODIUM 0.07 MG/1
75 TABLET ORAL DAILY
Qty: 90 TABLET | Refills: 2 | Status: SHIPPED | OUTPATIENT
Start: 2022-12-16

## 2023-01-26 DIAGNOSIS — E55.9 VITAMIN D DEFICIENCY: ICD-10-CM

## 2023-01-26 DIAGNOSIS — D50.8 IRON DEFICIENCY ANEMIA SECONDARY TO INADEQUATE DIETARY IRON INTAKE: ICD-10-CM

## 2023-01-26 DIAGNOSIS — K90.9 MALABSORPTION SYNDROME: ICD-10-CM

## 2023-01-26 RX ORDER — ERGOCALCIFEROL 1.25 MG/1
CAPSULE ORAL
Qty: 12 CAPSULE | Refills: 1 | Status: SHIPPED | OUTPATIENT
Start: 2023-01-26

## 2023-03-16 ENCOUNTER — OFFICE VISIT (OUTPATIENT)
Dept: INTERNAL MEDICINE CLINIC | Facility: CLINIC | Age: 58
End: 2023-03-16

## 2023-03-16 ENCOUNTER — TELEPHONE (OUTPATIENT)
Dept: INTERNAL MEDICINE CLINIC | Facility: CLINIC | Age: 58
End: 2023-03-16

## 2023-03-16 VITALS
RESPIRATION RATE: 16 BRPM | OXYGEN SATURATION: 97 % | SYSTOLIC BLOOD PRESSURE: 98 MMHG | TEMPERATURE: 97.6 F | DIASTOLIC BLOOD PRESSURE: 60 MMHG | HEART RATE: 72 BPM

## 2023-03-16 DIAGNOSIS — K52.9 COLITIS: ICD-10-CM

## 2023-03-16 DIAGNOSIS — R69 TAKING MEDICATION FOR CHRONIC DISEASE: ICD-10-CM

## 2023-03-16 DIAGNOSIS — M25.50 POLYARTHRALGIA: ICD-10-CM

## 2023-03-16 DIAGNOSIS — M25.50 POLYARTHRALGIA: Primary | ICD-10-CM

## 2023-03-16 RX ORDER — HYDROXYCHLOROQUINE SULFATE 200 MG/1
200 TABLET, FILM COATED ORAL 2 TIMES DAILY WITH MEALS
Qty: 60 TABLET | Refills: 2 | Status: CANCELLED | OUTPATIENT
Start: 2023-03-16 | End: 2023-06-14

## 2023-03-16 RX ORDER — HYDROXYCHLOROQUINE SULFATE 200 MG/1
200 TABLET, FILM COATED ORAL 2 TIMES DAILY WITH MEALS
Qty: 60 TABLET | Refills: 2 | Status: SHIPPED | OUTPATIENT
Start: 2023-03-16 | End: 2023-03-17 | Stop reason: SDUPTHER

## 2023-03-16 NOTE — PROGRESS NOTES
Assessment/Plan:       Diagnoses and all orders for this visit:    Polyarthralgia  -     JOHN Screen w/ Reflex to Titer/Pattern; Future  -     Cyclic citrul peptide antibody, IgG; Future  -     Lyme Total Antibody Profile with reflex to WB; Future  -     RF Screen w/ Reflex to Titer; Future  -     Sedimentation rate, automated; Future  -     hydroxychloroquine (PLAQUENIL) 200 mg tablet; Take 1 tablet (200 mg total) by mouth 2 (two) times a day with meals    Taking medication for chronic disease  -     Ambulatory Referral to Ophthalmology; Future    Colitis  -     Ambulatory Referral to Gastroenterology; Future                Subjective:      Patient ID: Annette Pate is a 62 y o  female  49-year-old woman  Below this initial    Is the note from about 5 months ago  She has complaints of aches and pains all over and she has diagnoses listed below  Recent laboratory testing was actually good  She wants to know what can be done about the pain  My suggestion is that she try using Plaquenil 200 mg twice a day to treat a working diagnosis of seronegative arthritis rather than taking medication such as Tylenol or Aleve  This can be done for several months and if it works, we have a treatment and the diagnosis  I cautioned her that she should see an ophthalmologist first and be screened on a regular basis due to the risk of fortunately reversible eye damage from taking Plaquenil       "  Multiple musculoskeletal diagnoses and complaints  Diagnoses in history are:    Bilateral hip replacement  Fibromyalgia  Osteoarthritis   Back pain and anterior hip pain  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, also, apparently your hip replacements set off the alarms at the job  Job description involves loading heavy objects into boxes to be shipped to customers  We have reviewed her job description her limitations    Based upon her history, the own limitations have been noted: Lifting 10-25 lb 50% restriction, 50% restriction  Lifting greater than 25 lb, 100% restriction  Push pull 10-25 lb 50% restriction  Post pull greater than 25 lb 100% restriction  Bending and twisting 75% of restriction    Kneeling and squatting 25% restriction    No restriction ray climbing stairs, standing, and walking  No restriction for repetitive hand motion  No restriction for operating a vehicle  No restriction insight vision speech  Can work 40 hours a week given the above  Hypoglycemic episodes  No documentation of this with the meter but they seem pretty clear-cut; she will awaken in night with sweats and tremor and treat this with eating candy  These have been intermittent an ongoing for several years  No change in this; fortunately not disabling  Prior history :   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  The patient has iron deficiency and required intravenous iron  Ice craving was cured although she still has some fatigue  Vitamin-D deficiency being repleted with oral vitamin-D and will need to be recheck   Her principal issue in life is a major depressive disorder  This is rooted in a series of life events  Felony conviction for social security fraud 7 years ago; the patient apparently was receiving social security disability payments, even though she was not really eligible  Chronic pain from fibromyalgia    Hypertensive treated with lisinopril  Hypo thyroid on replacement  Hospitalized about  7 years ago for acute pancreatitis  This was felt to be due to using doxycycline in an attempt to treat the chronic pain as some kind of occult Lyme disease  The attempt failed completely and she was hospitalized with pancreatitis which took a while to resolve    Annita-en-Y gastric bypass surgery done in 2002 for maximum weight 260 lb  Complication of care was development of portal vein thrombosis  This required 7 years of anticoagulation    Essentially, no one was willing to take the risk to have her discontinue until almost   Off Coumadin since then with no recurrence of any other clot but portal vein thrombosis was noted during her hospitalization for pancreatitis   She had gained back a lot of weight when I 1st met her but now has lost it again and is down to 160 lb  Surgical history of bilateral hip replacement, cholecystectomy, 2  sections, and Annita-en-Y  She sees a therapist who in turn is supervised by a psychiatrist     She had seen a pain management physician for chronic low back pain but that physician thinks that she either has to live with her pain     Mammogram and colonoscopy are up today  , living up in [place]  2 children both college age  Not sexually active for many years  As a part of her multiple diagnoses she has complete loss of libido  She recognizes this but does not know what to do about it  Her therapist and psychiatrist or attempting to alleviate the situation by weaning her off Zoloft and switch her to bupropion  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    Smokeless cigarettes, rare alcohol, no illicit drugs  Father  from stroke with ASVD  Mother with hypertension hyperlipidemia  Also, history of aortic valve replacement due to rheumatic disease "      The following portions of the patient's history were reviewed and updated as appropriate:   She has a past medical history of Anxiety, Arthritis, Depression, Fibromyalgia, primary, H/O  section, History of gastric surgery, History of total hip replacement, Hypertension, Hypothyroidism, and Portal vein thrombosis  ,  does not have any pertinent problems on file  ,   has a past surgical history that includes Gastric bypass; Joint replacement (Bilateral); Colonoscopy; pr laps surg cholecystectomy w/cholangiography (N/A, 2017); pr esophagogastroduodenoscopy transoral diagnostic (N/A, 2017);  section; Hip surgery (Bilateral);  Cholecystectomy; and [place] tooth extraction  ,  family history includes Cervical cancer in her mother; Coronary artery disease in her father; Heart disease in her father; Hypertension in her brother, brother, brother, father, and mother; Other in her mother; Stroke in her father  ,   reports that she quit smoking about 11 years ago  Her smoking use included e-cigarettes  She has never used smokeless tobacco  She reports that she does not currently use alcohol  She reports that she does not currently use drugs after having used the following drugs: Marijuana  ,  has No Known Allergies     Current Outpatient Medications   Medication Sig Dispense Refill   • ALPRAZolam (XANAX) 1 mg tablet TAKE 1 TABLET BY MOUTH THREE TIMES DAILY AND AN ADDITIONAL HALF TABLET IF NEEDED DAILY     • buPROPion (WELLBUTRIN XL) 150 mg 24 hr tablet Take 150 mg by mouth daily     • cariprazine (VRAYLAR) 1 5 MG capsule Take 1 5 mg by mouth daily     • ergocalciferol (VITAMIN D2) 50,000 units TAKE 1 CAPSULE BY MOUTH ONE TIME PER WEEK 12 capsule 1   • hydroxychloroquine (PLAQUENIL) 200 mg tablet Take 1 tablet (200 mg total) by mouth 2 (two) times a day with meals 60 tablet 2   • Levomilnacipran HCl ER (FETZIMA) 40 MG extended release capsule Take 40 mg by mouth daily     • levothyroxine (Synthroid) 75 mcg tablet Take 1 tablet (75 mcg total) by mouth daily 90 tablet 2   • lisinopril (ZESTRIL) 10 mg tablet TAKE 1 TABLET DAILY 90 tablet 2     No current facility-administered medications for this visit  Review of Systems   Musculoskeletal: Positive for arthralgias, back pain, neck pain and neck stiffness  Objective:  Vitals:    03/16/23 1252   BP: 98/60   Pulse: 72   Resp: 16   Temp: 97 6 °F (36 4 °C)   SpO2: 97%      Physical Exam  Constitutional:       Appearance: Normal appearance  Cardiovascular:      Rate and Rhythm: Normal rate  Musculoskeletal:         General: No swelling  Skin:     General: Skin is warm and dry     Neurological:      Mental Status: She is alert  There are no Patient Instructions on file for this visit

## 2023-03-16 NOTE — TELEPHONE ENCOUNTER
[facility] does not cover the hydroxychloroquine (PLAQUENIL) 200 mg tablet     Please send the script for this medication to [facility], [id number]

## 2023-03-17 ENCOUNTER — TELEPHONE (OUTPATIENT)
Dept: ADMINISTRATIVE | Facility: OTHER | Age: 58
End: 2023-03-17

## 2023-03-17 DIAGNOSIS — M25.50 POLYARTHRALGIA: ICD-10-CM

## 2023-03-17 RX ORDER — HYDROXYCHLOROQUINE SULFATE 200 MG/1
200 TABLET, FILM COATED ORAL 2 TIMES DAILY WITH MEALS
Qty: 60 TABLET | Refills: 2 | Status: SHIPPED | OUTPATIENT
Start: 2023-03-17 | End: 2023-06-16

## 2023-03-17 NOTE — TELEPHONE ENCOUNTER
Upon review of the In Basket request we were able to locate, review, and update the patient chart as requested for Mammogram     Any additional questions or concerns should be emailed to the Practice Liaisons via the appropriate education email address, please do not reply via In Basket      Thank you  Melvin Berry MA

## 2023-03-17 NOTE — TELEPHONE ENCOUNTER
----- Message from Wayne Chavez LPN sent at 8/21/8808  2:24 PM EDT -----  Regarding: Thaddeus Carranza  03/16/23 2:24 PM    Hello, our patient William Worley has had Mammogram completed/performed  Please assist in updating the patient chart by pulling the Care Everywhere (CE) document  The date of service is 12/01/2023       Thank you,  Wayne Chavez LPN  [facility]

## 2023-03-27 LAB
ANA SER QL IF: NEGATIVE
B BURGDOR AB SER IA-ACNC: <0.9 INDEX
CCP IGG SERPL-ACNC: <16 UNITS
ERYTHROCYTE [SEDIMENTATION RATE] IN BLOOD BY WESTERGREN METHOD: 6 MM/H
RHEUMATOID FACT SERPL-ACNC: <14 IU/ML

## 2023-05-08 DIAGNOSIS — B35.1 ONYCHOMYCOSIS: ICD-10-CM

## 2023-05-08 DIAGNOSIS — I10 ESSENTIAL HYPERTENSION: ICD-10-CM

## 2023-05-09 RX ORDER — LISINOPRIL 10 MG/1
TABLET ORAL
Qty: 90 TABLET | Refills: 2 | Status: SHIPPED | OUTPATIENT
Start: 2023-05-09

## 2023-05-09 RX ORDER — TERBINAFINE HYDROCHLORIDE 250 MG/1
TABLET ORAL
Qty: 30 TABLET | Refills: 5 | Status: SHIPPED | OUTPATIENT
Start: 2023-05-09 | End: 2023-05-11

## 2023-05-11 ENCOUNTER — OFFICE VISIT (OUTPATIENT)
Dept: GASTROENTEROLOGY | Facility: CLINIC | Age: 58
End: 2023-05-11

## 2023-05-11 VITALS
BODY MASS INDEX: 25.33 KG/M2 | WEIGHT: 152 LBS | HEIGHT: 65 IN | DIASTOLIC BLOOD PRESSURE: 72 MMHG | OXYGEN SATURATION: 97 % | HEART RATE: 87 BPM | SYSTOLIC BLOOD PRESSURE: 126 MMHG

## 2023-05-11 DIAGNOSIS — R10.30 LOWER ABDOMINAL PAIN: ICD-10-CM

## 2023-05-11 DIAGNOSIS — K64.4 EXTERNAL HEMORRHOID, BLEEDING: ICD-10-CM

## 2023-05-11 DIAGNOSIS — R19.8 RECTAL DISCHARGE: Primary | ICD-10-CM

## 2023-05-11 DIAGNOSIS — K59.09 CHRONIC CONSTIPATION: ICD-10-CM

## 2023-05-11 NOTE — PROGRESS NOTES
[facility] - Outpatient Consultation  Rudolph Stephen 62 y o  female MRN: [id number]  Encounter: [id number]          ASSESSMENT AND PLAN:      1  Rectal discharge  2  Chronic constipation  3  Lower abdominal pain  4  External hemorrhoid, bleeding  - She has chronic constipation and lower abdominal pain/bloating likely 2/2 IBS-C presenting due to clear mucous discharge from her rectum ongoing for several weeks as well as for continued hemorrhoidal irritation/bleeding  - Suspect the mucous is related to her underlying constipation so we will try to regulate her bowel habits  - Start miralax 17 g daily  - Prep H PRN  - Will refer to colorectal surgery as she is really interested in definitive hemorrhoid treatment; we discussed that we do need to manage her constipation regardless as this will not be ideal in the future even if she has hemorrhoid banding/removal  - Schedule colonoscopy for further evaluation      ______________________________________________________________________    HPI:  Gretel Bonilla is a 61 yo F presenting for evaluation of several weeks of clear mucous discharge from her rectum  She does have a history of chronic constipation which worsens with iron intake but has never experienced this before  Will get lower abdominal pain associated with her constipation when she does not go for 3 to 4 days  She reports no improvement with stool softeners  She has known internal and external hemorrhoids and she reports these are getting irritated and bleed at times because of the constipation  She is really interested in having her hemorrhoids treated or removed at this point  She does use Preparation H as needed which seems to help  Her last colonoscopy was in 2020 with a different provider and [facility] because of insurance changes and she reports that she had 2 polyps removed    A colonoscopy prior to this was in 2017 and she had 1 polyp removed and internal and external hemorrhoids noted       REVIEW OF SYSTEMS:    CONSTITUTIONAL: Denies any fever, chills, rigors, and weight loss  HEENT: No earache or tinnitus  Denies hearing loss or visual disturbances  CARDIOVASCULAR: No chest pain or palpitations  RESPIRATORY: Denies any cough, hemoptysis, shortness of breath or dyspnea on exertion  GASTROINTESTINAL: As noted in the History of Present Illness  GENITOURINARY: No problems with urination  Denies any hematuria or dysuria  NEUROLOGIC: No dizziness or vertigo, denies headaches  MUSCULOSKELETAL: Denies any muscle or joint pain  SKIN: Denies skin rashes or itching  ENDOCRINE: Denies excessive thirst  Denies intolerance to heat or cold  PSYCHOSOCIAL: Denies depression or anxiety  Denies any recent memory loss  Historical Information   Past Medical History:   Diagnosis Date   • Anxiety    • Arthritis    • Depression    • Fibromyalgia, primary    • H/O  section    • History of gastric surgery     for morbid obesity   • History of total hip replacement    • Hypertension    • Hypothyroidism    • Portal vein thrombosis      Past Surgical History:   Procedure Laterality Date   •  SECTION      two   • CHOLECYSTECTOMY     • COLONOSCOPY     • GASTRIC BYPASS     • HIP SURGERY Bilateral     total hip replacement   • JOINT REPLACEMENT Bilateral     hips   • MT ESOPHAGOGASTRODUODENOSCOPY TRANSORAL DIAGNOSTIC N/A 2017    Procedure: EGD AND COLONOSCOPY;  Surgeon: Augustine Blanco MD;  Location: [facility];   Service: Gastroenterology   • MT LAPS SURG CHOLECYSTECTOMY [place] Vickie N/A 2017    Procedure: LAPAROSCOPIC CHOLECYSTECTOMY WITH IOC ;  Surgeon: Wilver Polanco MD;  Location: [facility];  Service: General   • WISDOM TOOTH EXTRACTION       Social History   Social History     Substance and Sexual Activity   Alcohol Use Not Currently    Comment: rarely     Social History     Substance and Sexual Activity   Drug Use Not Currently   • Types: Marijuana "Comment: occasionally/rarely     Social History     Tobacco Use   Smoking Status Former   • Types: E-Cigarettes   • Quit date: 2012   • Years since quittin 2   Smokeless Tobacco Never   Tobacco Comments    vapes- on/off     Family History   Problem Relation Age of Onset   • Hypertension Mother    • Cervical cancer Mother    • Other Mother         value replacement   • Heart disease Father    • Hypertension Father    • Stroke Father    • Coronary artery disease Father         multiple vessel   • Hypertension Brother    • Hypertension Brother    • Hypertension Brother        Meds/Allergies       Current Outpatient Medications:   •  ALPRAZolam (XANAX) 1 mg tablet  •  buPROPion (WELLBUTRIN XL) 150 mg 24 hr tablet  •  ergocalciferol (VITAMIN D2) 50,000 units  •  hydroxychloroquine (PLAQUENIL) 200 mg tablet  •  Levomilnacipran HCl ER (FETZIMA) 40 MG extended release capsule  •  levothyroxine (Synthroid) 75 mcg tablet  •  lisinopril (ZESTRIL) 10 mg tablet    No Known Allergies        Objective     Blood pressure 126/72, pulse 87, height 5' 5\" (1 651 m), weight 68 9 kg (152 lb), SpO2 97 %  Body mass index is 25 29 kg/m²  PHYSICAL EXAM:      General Appearance:   Alert, cooperative, no distress   HEENT:   Normocephalic, atraumatic, anicteric      Neck:  Supple, symmetrical, trachea midline   Lungs:   Clear to auscultation bilaterally; no rales, rhonchi or wheezing; respirations unlabored    Heart[de-identified]   Regular rate and rhythm; no murmur, rub, or gallop  Abdomen:   Soft, non-tender, non-distended; normal bowel sounds; no masses, no organomegaly    Genitalia:   Deferred    Rectal:   Deferred    Extremities:  No cyanosis, clubbing or edema    Pulses:  2+ and symmetric    Skin:  No jaundice, rashes, or lesions    Lymph nodes:  No palpable cervical lymphadenopathy        Lab Results:   No visits with results within 1 Day(s) from this visit     Latest known visit with results is:   Orders Only on 2023 " Component Date Value   • Sedimentation Rate 03/23/2023 6    • SL AMB LYME AB SCREEN 03/23/2023 <0 90    • JOHN Screen, IFA 03/23/2023 NEGATIVE    • Rheumatoid Factor 08/70/8604 <82    • Cyclic Citrullinated Pep* 03/23/2023 <16          Radiology Results:   No results found

## 2023-05-11 NOTE — PATIENT INSTRUCTIONS
Scheduled date of colonoscopy (as of today):7/27/23  Physician performing colonoscopy: Shaun  Location of colonoscopy:[place]  Bowel prep reviewed with patient:miralax/dulcolax  Instructions reviewed with patient by:Letitia JOSEPH  Clearances:  none

## 2023-05-23 DIAGNOSIS — E55.9 VITAMIN D DEFICIENCY: ICD-10-CM

## 2023-05-23 DIAGNOSIS — D50.8 IRON DEFICIENCY ANEMIA SECONDARY TO INADEQUATE DIETARY IRON INTAKE: ICD-10-CM

## 2023-05-23 DIAGNOSIS — K90.9 MALABSORPTION SYNDROME: ICD-10-CM

## 2023-05-23 RX ORDER — ERGOCALCIFEROL 1.25 MG/1
CAPSULE ORAL
Qty: 12 CAPSULE | Refills: 1 | Status: SHIPPED | OUTPATIENT
Start: 2023-05-23

## 2023-06-15 ENCOUNTER — TELEPHONE (OUTPATIENT)
Dept: GASTROENTEROLOGY | Facility: CLINIC | Age: 58
End: 2023-06-15

## 2023-06-15 NOTE — TELEPHONE ENCOUNTER
Scheduled date of colonoscopy (as of today): 8/31/23    Physician performing colonoscopy: Geralynn Bamberger    Location of colonoscopy: [facility]    *Patient had a conflict on 2/98/32  Would like a sooner Thursday or Friday if becomes available

## 2023-06-16 DIAGNOSIS — M25.50 POLYARTHRALGIA: ICD-10-CM

## 2023-06-16 RX ORDER — HYDROXYCHLOROQUINE SULFATE 200 MG/1
TABLET, FILM COATED ORAL
Qty: 60 TABLET | Refills: 2 | Status: SHIPPED | OUTPATIENT
Start: 2023-06-16 | End: 2023-07-16

## 2023-06-22 ENCOUNTER — OFFICE VISIT (OUTPATIENT)
Age: 58
End: 2023-06-22
Payer: COMMERCIAL

## 2023-06-22 VITALS
WEIGHT: 155 LBS | BODY MASS INDEX: 25.83 KG/M2 | HEART RATE: 55 BPM | RESPIRATION RATE: 17 BRPM | HEIGHT: 65 IN | SYSTOLIC BLOOD PRESSURE: 120 MMHG | TEMPERATURE: 97.6 F | OXYGEN SATURATION: 98 % | DIASTOLIC BLOOD PRESSURE: 76 MMHG

## 2023-06-22 DIAGNOSIS — M15.9 PRIMARY OSTEOARTHRITIS INVOLVING MULTIPLE JOINTS: ICD-10-CM

## 2023-06-22 DIAGNOSIS — M79.7 FIBROMYALGIA, PRIMARY: ICD-10-CM

## 2023-06-22 DIAGNOSIS — M13.88: Primary | ICD-10-CM

## 2023-06-22 PROCEDURE — 99213 OFFICE O/P EST LOW 20 MIN: CPT | Performed by: INTERNAL MEDICINE

## 2023-06-22 NOTE — PROGRESS NOTES
Assessment/Plan:       Diagnoses and all orders for this visit:    Seronegative arthritis of joint of spine    Primary osteoarthritis involving multiple joints    Fibromyalgia, primary    Other orders  -     Dextromethorphan-buPROPion ER (Auvelity)  MG TBCR; Take by mouth Patient taking 45 mg once daily  Subjective:      Patient ID: May Velásquez is a 62 y o  female  49-year-old woman    Since 2008 she has had joint problems  She developed onset of joint pains  Within 5 years she had progressed to needing both hips done  However, she also has diffuse aching and pain all over  Current working diagnosis of fibromyalgia has been entertained and she has been treated in this fashion  Additionally, I have given her Plaquenil 200 p o  twice daily as a therapeutic trial for at least 3 months to see if we could be dealing with a seronegative autoimmune illness  She has been on it for about 1 month and unfortunately it has not had much of an effect  This syndrome of aching and pain has resulted in some functional limitations, fortunately not a lot  She is symptomatic every day symptomatic all day but usually manages to get through her day  However, once or twice a month  at a minimum she will experience aching and pain to such intensity that she will have to not go to work  These episodes last about 1 to 2 days at the most     Diagnoses listed in history are:    Bilateral hip replacement  Fibromyalgia  Osteoarthritis   Back pain and anterior hip pain  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, also, apparently your hip replacements set off the alarms at the job  Job description involves loading heavy objects into boxes to be shipped to customers  We have reviewed her job description her limitations  Based upon her history, the own limitations have been noted:  Lifting 10-25 lb 50% restriction, 50% restriction    Lifting greater than 25 lb, 100% restriction  Push pull 10-25 lb 50% restriction  Post pull greater than 25 lb 100% restriction  Bending and twisting 75% of restriction    Kneeling and squatting 25% restriction    No restriction ray climbing stairs, standing, and walking  No restriction for repetitive hand motion  No restriction for operating a vehicle  No restriction insight vision speech  Can work 40 hours a week given the above  Hypoglycemic episodes  No documentation of this with the meter but they seem pretty clear-cut; she will awaken in night with sweats and tremor and treat this with eating candy  These have been intermittent an ongoing for several years  No change in this; fortunately not disabling  Prior history :   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  The patient has iron deficiency and required intravenous iron  Ice craving was cured although she still has some fatigue  Vitamin-D deficiency being repleted with oral vitamin-D   Her principal issue in life is a major depressive disorder  This is rooted in a series of life events  Felony conviction for social security fraud 7 years ago; the patient apparently was receiving social security disability payments, even though she was not really eligible  Chronic pain from fibromyalgia    Hypertensive treated with lisinopril  Hypo thyroid on replacement  Hospitalized about  7 years ago for acute pancreatitis  This was felt to be due to using doxycycline in an attempt to treat the chronic pain as some kind of occult Lyme disease  The attempt failed completely and she was hospitalized with pancreatitis which took a while to resolve    Annita-en-Y gastric bypass surgery done in 2002 for maximum weight 260 lb  Complication of care was development of portal vein thrombosis  This required 7 years of anticoagulation  Essentially, no one was willing to take the risk to have her discontinue until almost 2010    Off Coumadin since then with no recurrence of any other clot "but portal vein thrombosis was noted during her hospitalization for pancreatitis   She had gained back a lot of weight when I 1st met her but now has lost it again and is down to 160 lb  Surgical history of bilateral hip replacement, cholecystectomy, 2  sections, and Annita-en-Y  She sees a therapist who in turn is supervised by a psychiatrist     She had seen a pain management physician for chronic low back pain but that physician thinks that she either has to live with her pain     Mammogram and colonoscopy are up today  , living up in [place]  2 children both college age  Not sexually active for many years  As a part of her multiple diagnoses she has complete loss of libido  She recognizes this but does not know what to do about it  Her therapist and psychiatrist or attempting to alleviate the situation by weaning her off Zoloft and switch her to bupropion  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    Smokeless cigarettes, rare alcohol, no illicit drugs  Father  from stroke with ASVD  Mother with hypertension hyperlipidemia  Also, history of aortic valve replacement due to rheumatic disease \"      The following portions of the patient's history were reviewed and updated as appropriate:   She has a past medical history of Anxiety, Arthritis, Depression, Fibromyalgia, primary, H/O  section, History of gastric surgery, History of total hip replacement, Hypertension, Hypothyroidism, and Portal vein thrombosis  ,  does not have any pertinent problems on file  ,   has a past surgical history that includes Gastric bypass; Joint replacement (Bilateral); Colonoscopy; pr laps surg cholecystectomy w/cholangiography (N/A, 2017); pr esophagogastroduodenoscopy transoral diagnostic (N/A, 2017);  section; Hip surgery (Bilateral); Cholecystectomy; and [place] tooth extraction  ,  family history includes Cervical cancer in her mother; Coronary artery disease in her father; " Heart disease in her father; Hypertension in her brother, brother, brother, father, and mother; Other in her mother; Stroke in her father  ,   reports that she quit smoking about 11 years ago  Her smoking use included e-cigarettes  She has never used smokeless tobacco  She reports that she does not currently use alcohol  She reports that she does not currently use drugs after having used the following drugs: Marijuana  ,  has No Known Allergies     Current Outpatient Medications   Medication Sig Dispense Refill   • ALPRAZolam (XANAX) 1 mg tablet TAKE 1 TABLET BY MOUTH THREE TIMES DAILY AND AN ADDITIONAL HALF TABLET IF NEEDED DAILY     • buPROPion (WELLBUTRIN XL) 150 mg 24 hr tablet Take 150 mg by mouth daily     • Dextromethorphan-buPROPion ER (Auvelity)  MG TBCR Take by mouth Patient taking 45 mg once daily  • ergocalciferol (VITAMIN D2) 50,000 units TAKE 1 CAPSULE BY MOUTH ONE TIME PER WEEK 12 capsule 1   • hydroxychloroquine (PLAQUENIL) 200 mg tablet TAKE 1 TABLET BY MOUTH 2 TIMES A DAY WITH MEALS  60 tablet 2   • levothyroxine (Synthroid) 75 mcg tablet Take 1 tablet (75 mcg total) by mouth daily 90 tablet 2   • lisinopril (ZESTRIL) 10 mg tablet TAKE 1 TABLET DAILY 90 tablet 2   • Levomilnacipran HCl ER (FETZIMA) 40 MG extended release capsule Take 40 mg by mouth daily (Patient not taking: Reported on 6/22/2023)       No current facility-administered medications for this visit  Review of Systems   Constitutional: Positive for fatigue  Musculoskeletal: Positive for arthralgias, back pain and myalgias  Psychiatric/Behavioral: Positive for dysphoric mood  Objective:  Vitals:    06/22/23 1308   BP: 120/76   Pulse: 55   Resp: 17   Temp: 97 6 °F (36 4 °C)   SpO2: 98%      Physical Exam  Cardiovascular:      Rate and Rhythm: Normal rate  Pulmonary:      Effort: Pulmonary effort is normal    Neurological:      General: No focal deficit present  Mental Status: She is alert     Psychiatric: Mood and Affect: Mood normal            Patient Instructions   Patient with the above chronic pain syndrome  FMLA forms signed reflect this situation    Continue the Plaquenil for at least 6 weeks; see me after that

## 2023-06-22 NOTE — PATIENT INSTRUCTIONS
Patient with the above chronic pain syndrome  FMLA forms signed reflect this situation    Continue the Plaquenil for at least 6 weeks; see me after that

## 2023-07-05 ENCOUNTER — TELEPHONE (OUTPATIENT)
Age: 58
End: 2023-07-05

## 2023-08-17 ENCOUNTER — OFFICE VISIT (OUTPATIENT)
Age: 58
End: 2023-08-17
Payer: COMMERCIAL

## 2023-08-17 VITALS
DIASTOLIC BLOOD PRESSURE: 72 MMHG | TEMPERATURE: 98 F | SYSTOLIC BLOOD PRESSURE: 114 MMHG | BODY MASS INDEX: 25.99 KG/M2 | WEIGHT: 156 LBS | HEART RATE: 54 BPM | RESPIRATION RATE: 16 BRPM | OXYGEN SATURATION: 93 % | HEIGHT: 65 IN

## 2023-08-17 DIAGNOSIS — Z78.0 POSTMENOPAUSAL: ICD-10-CM

## 2023-08-17 DIAGNOSIS — M15.9 PRIMARY OSTEOARTHRITIS INVOLVING MULTIPLE JOINTS: Primary | ICD-10-CM

## 2023-08-17 DIAGNOSIS — M54.50 LUMBAR PAIN: ICD-10-CM

## 2023-08-17 PROCEDURE — 99213 OFFICE O/P EST LOW 20 MIN: CPT | Performed by: INTERNAL MEDICINE

## 2023-08-17 NOTE — PROGRESS NOTES
Assessment/Plan:       Diagnoses and all orders for this visit:    Primary osteoarthritis involving multiple joints  -     Ambulatory Referral to Comprehensive Spine Program; Future  -     Ambulatory referral to Spine & Pain Management; Future    Lumbar pain  -     Ambulatory Referral to Comprehensive Spine Program; Future  -     Ambulatory referral to Spine & Pain Management; Future    Postmenopausal  -     DXA bone density spine hip and pelvis; Future                Subjective:      Patient ID: Gibran De Leon is a 62 y.o. female. I gave this 54-year-old lady with multiple joint aches and pains a therapeutic trial of Plaquenil for 2 months and it did not do a thing. This, combined with the fact that her markers are all negative strongly strongly argues against an inflammatory diagnosis and in favor of "fibromyalgia". The following portions of the patient's history were reviewed and updated as appropriate:   She has a past medical history of Anxiety, Arthritis, Depression, Fibromyalgia, primary, H/O  section, History of gastric surgery, History of total hip replacement, Hypertension, Hypothyroidism, and Portal vein thrombosis. ,  does not have any pertinent problems on file. ,   has a past surgical history that includes Gastric bypass; Joint replacement (Bilateral); Colonoscopy; pr laps surg cholecystectomy w/cholangiography (N/A, 2017); pr esophagogastroduodenoscopy transoral diagnostic (N/A, 2017);  section; Hip surgery (Bilateral); Cholecystectomy; and [place] tooth extraction. ,  family history includes Cervical cancer in her mother; Coronary artery disease in her father; Heart disease in her father; Hypertension in her brother, brother, brother, father, and mother; Other in her mother; Stroke in her father. ,   reports that she quit smoking about 11 years ago. Her smoking use included e-cigarettes.  She has never used smokeless tobacco. She reports that she does not currently use alcohol. She reports that she does not currently use drugs after having used the following drugs: Marijuana. ,  has No Known Allergies. .  Current Outpatient Medications   Medication Sig Dispense Refill   • ALPRAZolam (XANAX) 1 mg tablet TAKE 1 TABLET BY MOUTH THREE TIMES DAILY AND AN ADDITIONAL HALF TABLET IF NEEDED DAILY     • Dextromethorphan-buPROPion ER (Auvelity)  MG TBCR Take by mouth Patient taking 45 mg once daily. • ergocalciferol (VITAMIN D2) 50,000 units TAKE 1 CAPSULE BY MOUTH ONE TIME PER WEEK 12 capsule 1   • levothyroxine (Synthroid) 75 mcg tablet Take 1 tablet (75 mcg total) by mouth daily 90 tablet 2   • lisinopril (ZESTRIL) 10 mg tablet TAKE 1 TABLET DAILY 90 tablet 2     No current facility-administered medications for this visit. Review of Systems   Constitutional: Positive for fatigue. Musculoskeletal: Positive for arthralgias and back pain. All other systems reviewed and are negative. Objective:  Vitals:    08/17/23 1519   BP: 114/72   Pulse: (!) 54   Resp: 16   Temp: 98 °F (36.7 °C)   SpO2: 93%      Physical Exam  Cardiovascular:      Rate and Rhythm: Normal rate. Musculoskeletal:         General: Normal range of motion. Neurological:      Mental Status: She is alert. Psychiatric:         Mood and Affect: Mood normal.           Patient Instructions   Patient with the above history and physical  Referral to orthopedic and comprehensive spine. Principal reason for orthopedic consultation is to follow-up on the left calf "mass ".

## 2023-08-18 ENCOUNTER — TELEPHONE (OUTPATIENT)
Dept: PHYSICAL THERAPY | Facility: OTHER | Age: 58
End: 2023-08-18

## 2023-08-18 NOTE — TELEPHONE ENCOUNTER
Call placed to the patient per Comprehensive Spine Program referral.    Spoke with the patient who would possibly be interested in PT. But would like to see the PM doctor first to make sure, PT is warranted. PCP entered a ref for PM as well as comp spine. Patient was given the phone number for PM to inquire about scheduling.      Patient will call comp spine back after consult    Ref closed

## 2023-08-23 ENCOUNTER — TELEPHONE (OUTPATIENT)
Dept: GASTROENTEROLOGY | Facility: CLINIC | Age: 58
End: 2023-08-23

## 2023-08-23 NOTE — TELEPHONE ENCOUNTER
Pt called in stating she can't get anyone to drive her to her colonoscopy on 8/31. She would like to know if there are any other appts. She's requesting a call back at [phone]. She says if she doesn't answer, please leave a few dates and times on her voicemail and she'll call back when she can.

## 2023-08-25 ENCOUNTER — TELEPHONE (OUTPATIENT)
Age: 58
End: 2023-08-25

## 2023-08-25 NOTE — TELEPHONE ENCOUNTER
Scheduled date of colonoscopy (as of today):9/14/2023  Physician performing colonoscopy:Dr. Susanna Kyle  Location of colonoscopy:[facility]   Bowel prep reviewed with patient:Miralax/Dulcolax  Instructions reviewed with patient by:Marlys

## 2023-09-02 DIAGNOSIS — I10 ESSENTIAL HYPERTENSION: ICD-10-CM

## 2023-09-05 RX ORDER — LEVOTHYROXINE SODIUM 0.07 MG/1
75 TABLET ORAL DAILY
Qty: 90 TABLET | Refills: 2 | Status: SHIPPED | OUTPATIENT
Start: 2023-09-05

## 2023-09-06 NOTE — PROGRESS NOTES
Assessment:  No diagnosis found. Plan:  No orders of the defined types were placed in this encounter. No orders of the defined types were placed in this encounter. My impressions and treatment recommendations were discussed in detail with the patient, who verbalized understanding and had no further questions. ***    {Oral Swab Statement:31621}    {Opioid Statement:86839}    {UDS Statement:59758}    {PDMP Statement:31099}    {Pain Management Procedure Statement:43504}    Follow-up is planned in *** time or sooner as warranted. Discharge instructions were provided. I personally saw and examined the patient and I agree with the above discussed plan of care. History of Present Illness:    Alyssa Dior is a 62 y.o. female who presents to [facility] for initial evaluation of the above stated pain complaints. The patient has a past medical and chronic pain history as outlined in the assessment section. {He/she (caps):28851} was referred by Chelsea Dorman MD  No address on file ***.    ***    Review of Systems:    Review of Systems        Past Medical History:   Diagnosis Date   • Anxiety    • Arthritis    • Depression    • Fibromyalgia, primary    • H/O  section    • History of gastric surgery     for morbid obesity   • History of total hip replacement    • Hypertension    • Hypothyroidism    • Portal vein thrombosis        Past Surgical History:   Procedure Laterality Date   •  SECTION      two   • CHOLECYSTECTOMY     • COLONOSCOPY     • GASTRIC BYPASS     • HIP SURGERY Bilateral     total hip replacement   • JOINT REPLACEMENT Bilateral     hips   • AR ESOPHAGOGASTRODUODENOSCOPY TRANSORAL DIAGNOSTIC N/A 2017    Procedure: EGD AND COLONOSCOPY;  Surgeon: Sari Rooney MD;  Location: [facility];   Service: Gastroenterology   • AR LAPS SURG CHOLECYSTECTOMY [place] Rodríguez N/A 2017    Procedure: LAPAROSCOPIC CHOLECYSTECTOMY WITH IOC ;  Surgeon: Blossom Hoffmann MD;  Location: [facility] OR;  Service: General   • WISDOM TOOTH EXTRACTION         Family History   Problem Relation Age of Onset   • Hypertension Mother    • Cervical cancer Mother    • Other Mother         value replacement   • Heart disease Father    • Hypertension Father    • Stroke Father    • Coronary artery disease Father         multiple vessel   • Hypertension Brother    • Hypertension Brother    • Hypertension Brother        Social History     Occupational History   • Not on file   Tobacco Use   • Smoking status: Former     Types: E-Cigarettes     Quit date: 2012     Years since quittin.5   • Smokeless tobacco: Never   • Tobacco comments:     vapes- on/off   Vaping Use   • Vaping Use: Every day   • Substances: Nicotine   Substance and Sexual Activity   • Alcohol use: Not Currently     Comment: rarely   • Drug use: Not Currently     Types: Marijuana     Comment: occasionally/rarely   • Sexual activity: Yes     Partners: Male         Current Outpatient Medications:   •  ALPRAZolam (XANAX) 1 mg tablet, TAKE 1 TABLET BY MOUTH THREE TIMES DAILY AND AN ADDITIONAL HALF TABLET IF NEEDED DAILY, Disp: , Rfl:   •  Dextromethorphan-buPROPion ER (Auvelity)  MG TBCR, Take by mouth Patient taking 45 mg once daily. , Disp: , Rfl:   •  ergocalciferol (VITAMIN D2) 50,000 units, TAKE 1 CAPSULE BY MOUTH ONE TIME PER WEEK, Disp: 12 capsule, Rfl: 1  •  levothyroxine 75 mcg tablet, TAKE 1 TABLET BY MOUTH EVERY DAY, Disp: 90 tablet, Rfl: 2  •  lisinopril (ZESTRIL) 10 mg tablet, TAKE 1 TABLET DAILY, Disp: 90 tablet, Rfl: 2    No Known Allergies    Physical Exam:    LMP  (LMP Unknown)     Constitutional: {General Appearance:51113::"normal, well developed, well nourished, alert, in no distress and non-toxic and no overt pain behavior. "}  Eyes: {Sclera:68105::"anicteric"}  HEENT: {Hearin::"grossly intact"}  Neck: {Neck:54471::"supple, symmetric, trachea midline and no masses "}  Pulmonary:{Respiratory effort:16686::"even and unlabored"}  Cardiovascular:{Examination of Extremities:30707::"No edema or pitting edema present"}  Skin:{Skin and Subcutaneous tissues:87763::"Normal without rashes or lesions and well hydrated"}  Psychiatric:{Mood and Affect:56839::"Mood and affect appropriate"}  Neurologic:{Cranial Nerves:37034::"Cranial Nerves II-XII grossly intact"}  Musculoskeletal:{Gait and Station:28331::"normal"}    Imaging  No orders to display       No orders of the defined types were placed in this encounter.

## 2023-09-08 ENCOUNTER — APPOINTMENT (OUTPATIENT)
Dept: RADIOLOGY | Facility: CLINIC | Age: 58
End: 2023-09-08
Payer: COMMERCIAL

## 2023-09-08 ENCOUNTER — CONSULT (OUTPATIENT)
Dept: PAIN MEDICINE | Facility: CLINIC | Age: 58
End: 2023-09-08
Payer: COMMERCIAL

## 2023-09-08 VITALS
SYSTOLIC BLOOD PRESSURE: 131 MMHG | DIASTOLIC BLOOD PRESSURE: 83 MMHG | HEART RATE: 53 BPM | WEIGHT: 156 LBS | BODY MASS INDEX: 25.99 KG/M2 | HEIGHT: 65 IN

## 2023-09-08 DIAGNOSIS — M79.7 FIBROMYALGIA, PRIMARY: ICD-10-CM

## 2023-09-08 DIAGNOSIS — M54.50 CHRONIC BILATERAL LOW BACK PAIN, UNSPECIFIED WHETHER SCIATICA PRESENT: ICD-10-CM

## 2023-09-08 DIAGNOSIS — M54.50 LUMBAR PAIN: ICD-10-CM

## 2023-09-08 DIAGNOSIS — Z96.643 HISTORY OF BILATERAL HIP REPLACEMENTS: ICD-10-CM

## 2023-09-08 DIAGNOSIS — M15.9 PRIMARY OSTEOARTHRITIS INVOLVING MULTIPLE JOINTS: ICD-10-CM

## 2023-09-08 DIAGNOSIS — G89.4 CHRONIC PAIN SYNDROME: Primary | ICD-10-CM

## 2023-09-08 DIAGNOSIS — G89.29 CHRONIC BILATERAL LOW BACK PAIN, UNSPECIFIED WHETHER SCIATICA PRESENT: ICD-10-CM

## 2023-09-08 DIAGNOSIS — R25.2 LEG CRAMPING: ICD-10-CM

## 2023-09-08 PROCEDURE — 73521 X-RAY EXAM HIPS BI 2 VIEWS: CPT

## 2023-09-08 PROCEDURE — 72100 X-RAY EXAM L-S SPINE 2/3 VWS: CPT

## 2023-09-08 PROCEDURE — 99204 OFFICE O/P NEW MOD 45 MIN: CPT | Performed by: STUDENT IN AN ORGANIZED HEALTH CARE EDUCATION/TRAINING PROGRAM

## 2023-09-08 NOTE — PATIENT INSTRUCTIONS
Lower Back Exercises   WHAT YOU NEED TO KNOW:   Lower back exercises help heal and strengthen your back muscles to prevent another injury. Ask your healthcare provider if you need to see a physical therapist for more advanced exercises. DISCHARGE INSTRUCTIONS:   Return to the emergency department if:   You have severe pain that prevents you from moving. Call your doctor if:   Your pain becomes worse. You have new pain. You have questions or concerns about your condition or care. Do lower back exercises safely:   Do the exercises on a mat or firm surface (not on a bed). A firm surface will support your spine and prevent low back pain. Move slowly and smoothly. Avoid fast or jerky motions. Breathe normally. Do not hold your breath. Stop if you feel pain. It is normal to feel some discomfort at first, but you should not feel pain. Regular exercise will help decrease your discomfort over time. Lower back exercises: Your healthcare provider may recommend that you do back exercises 10 to 30 minutes each day. He or she may also recommend that you do exercises 1 to 3 times each day. Ask your provider which exercises are best for you and how often to do them. Ankle pumps:  Lie on your back. Move your foot up (with your toes pointing toward your head). Then, move your foot down (with your toes pointing away from you). Repeat this exercise 10 times on each side. Heel slides:  Lie on your back. Slowly bend one leg and then straighten it. Next, bend the other leg and then straighten it. Repeat 10 times on each side. Pelvic tilt:  Lie on your back with your knees bent and feet flat on the floor. Place your arms in a relaxed position beside your body. Tighten the muscles of your abdomen and flatten your back against the floor. Hold for 5 seconds. Repeat 5 times. Back stretch:  Lie on your back with your hands behind your head.  Bend your knees and turn the lower half of your body to one side. Hold this position for 10 seconds. Repeat 3 times on each side. Straight leg raises:  Lie on your back with one leg straight. Bend the other knee. Tighten your abdomen and then slowly lift the straight leg up about 6 to 12 inches off the floor. Hold for 1 to 5 seconds. Lower your leg slowly. Repeat 10 times on each leg. Knee-to-chest:  Lie on your back with your knees bent and feet flat on the floor. Pull one of your knees toward your chest and hold it there for 5 seconds. Return your leg to the starting position. Lift the other knee toward your chest and hold for 5 seconds. Do this 5 times on each side. Cat and camel:  Place your hands and knees on the floor. Arch your back upward toward the ceiling and lower your head. Round out your spine as much as you can. Hold for 5 seconds. Lift your head upward and push your chest downward toward the floor. Hold for 5 seconds. Do 3 sets or as directed. Wall squats:  Stand with your back against a wall. Tighten the muscles of your abdomen. Slowly lower your body until your knees are bent at a 45 degree angle. Hold this position for 5 seconds. Slowly move back up to a standing position. Repeat 10 times. Curl up:  Lie on your back with your knees bent and feet flat on the floor. Place your hands, palms down, underneath the curve in your lower back. Next, with your elbows on the floor, lift your shoulders and chest 2 to 3 inches. Keep your head in line with your shoulders. Hold this position for 5 seconds. When you can do this exercise without pain for 10 to 15 seconds, you may add a rotation. While your shoulders and chest are lifted off the ground, turn slightly to the left and hold. Repeat on the other side. Bird dog:  Place your hands and knees on the floor. Keep your wrists directly below your shoulders and your knees directly below your hips. Pull your belly button in toward your spine.  Do not flatten or arch your back. Tighten your abdominal muscles. Raise one arm straight out so that it is aligned with your head. Next, raise the leg opposite your arm. Hold this position for 15 seconds. Lower your arm and leg slowly and change sides. Do 5 sets. Follow up with your doctor as directed:  Write down your questions so you remember to ask them during your visits. © Copyright [employer] 2022 Information is for End User's use only and may not be sold, redistributed or otherwise used for commercial purposes. The above information is an  only. It is not intended as medical advice for individual conditions or treatments. Talk to your doctor, nurse or pharmacist before following any medical regimen to see if it is safe and effective for you.

## 2023-09-08 NOTE — PROGRESS NOTES
Assessment:  1. Chronic pain syndrome    2. Primary osteoarthritis involving multiple joints    3. Lumbar pain    4. Fibromyalgia, primary    5. Chronic bilateral low back pain, unspecified whether sciatica present    6. History of bilateral hip replacements    7. Leg cramping        Plan:  Orders Placed This Encounter   Procedures   • XR hips bilateral 2 vw w pelvis if performed     Standing Status:   Future     Standing Expiration Date:   9/8/2027     Scheduling Instructions:      Bring along any outside films relating to this procedure. Order Specific Question:   Is the patient pregnant? Answer:   Unknown   • X-ray lumbar spine 2 or 3 views     Standing Status:   Future     Standing Expiration Date:   9/8/2027     Scheduling Instructions:      Bring along any outside films relating to this procedure. Order Specific Question:   Is the patient pregnant? Answer:   Unknown       No orders of the defined types were placed in this encounter. My impressions and treatment recommendations were discussed in detail with the patient, who verbalized understanding and had no further questions. This is a 22-year-old female presents her office for 2 main issues. She has notable increased bilateral low back pain that is far greater on the right. She has increased pain with facet loading and tenderness palpation over the lower lumbar facet joints. Differential diagnosis includes facet mediated disease. We will order x-ray of the lumbar spine to assess for this. If notable disease, can consider MBB/RFA. She does not have pain radiating past the knees to indicate clear radicular symptoms. She is also complaining of pain in the bilateral groins for the last several months. She has history of bilateral hip replacement about 9 years ago. She is concerned about whether she may be having an issue within the prosthetics.   On exam she does have pain in the groins with Stinchfield maneuver and logrolling. I will order x-ray of the hips to assess for integrity of orthopedic hardware. If there is an abnormality, then we will refer her to orthopedic surgery. I am also ordering home exercise program for hte lower back. She will perform these consistently for a period of 6 weeks and return for reevaluation. If she has no improvement in her symptoms then we will consider more updated imaging of the lumbar spine    [place] Prescription Drug Monitoring Program report was reviewed and was appropriate     Complete risks and benefits including bleeding, infection, tissue reaction, nerve injury and allergic reaction were discussed. The approach was demonstrated using models and literature was provided. Verbal and written consent was obtained. Discharge instructions were provided. I personally saw and examined the patient and I agree with the above discussed plan of care. History of Present Illness:    David Pritchett is a 62 y.o. female who presents to [facility] for initial evaluation of the above stated pain complaints. The patient has a past medical and chronic pain history as outlined in the assessment section. She was referred by Elder Whitaker MD  No address on file     Here with chief complaint of widespread pain. Neck, right shoulder, bilateral thumbs, lower back, bilateral legs. She reports primarily right-sided low back pain with radiation down the bilateral lower extremities. Travels down the buttocks and can also going to the anterior and posterior upper legs. Patient has notable history of bilateral hip replacement. She is reporting some pain in the bilateral groins as well. Moderate to severe in intensity over the past month. 7-8 out of 10. Constant. No typical pattern. Cramping, shooting, sharp, cutting, dull/aching in nature. It is increased with standing, bending, walking, exercise, coughing, sneezing.   No change with lying down, sitting, relaxation. Previous patient of Dr. Onel Hollis. Past medical she includes anxiety, depression, fibromyalgia. Next  No relief with PT, exercise. Moderate leaf with injections in the past.  No relief with heat/ice therapy. Next  No tobacco.  No alcohol use. Not on blood thinners. Not allergic to latex or contrast dye. She does use marijuana 3-4 times a week. In the past has been on hydrocodone and oxycodone with relief. Currently using acetaminophen, aspirin, ibuprofen. In the past use naproxen. Also currently using Xanax. In the past has been on Zoloft and Effexor as well as gabapentin. .    Review of Systems:    Review of Systems   Constitutional: Positive for unexpected weight change. Musculoskeletal: Positive for arthralgias and myalgias. Psychiatric/Behavioral: Positive for decreased concentration. Anxiety   Depression           Past Medical History:   Diagnosis Date   • Anxiety    • Arthritis    • Depression    • Fibromyalgia, primary    • H/O  section    • History of gastric surgery     for morbid obesity   • History of total hip replacement    • Hypertension    • Hypothyroidism    • Portal vein thrombosis        Past Surgical History:   Procedure Laterality Date   •  SECTION      two   • CHOLECYSTECTOMY     • COLONOSCOPY     • GASTRIC BYPASS     • HIP SURGERY Bilateral     total hip replacement   • JOINT REPLACEMENT Bilateral     hips   • [place] ESOPHAGOGASTRODUODENOSCOPY TRANSORAL DIAGNOSTIC N/A 2017    Procedure: EGD AND COLONOSCOPY;  Surgeon: Mason Seth MD;  Location: [facility];   Service: Gastroenterology   • SC LAPS SURG CHOLECYSTECTOMY [facility] N/A 2017    Procedure: LAPAROSCOPIC CHOLECYSTECTOMY WITH IOC ;  Surgeon: Sarah Duke MD;  Location: [facility];  Service: General   • WISDOM TOOTH EXTRACTION         Family History   Problem Relation Age of Onset   • Hypertension Mother    • Cervical cancer Mother    • Other Mother value replacement   • Heart disease Father    • Hypertension Father    • Stroke Father    • Coronary artery disease Father         multiple vessel   • Hypertension Brother    • Hypertension Brother    • Hypertension Brother        Social History     Occupational History   • Not on file   Tobacco Use   • Smoking status: Former     Types: E-Cigarettes     Quit date: 2012     Years since quittin.5   • Smokeless tobacco: Never   • Tobacco comments:     vapes- on/off   Vaping Use   • Vaping Use: Every day   • Substances: Nicotine   Substance and Sexual Activity   • Alcohol use: Not Currently     Comment: rarely   • Drug use: Not Currently     Types: Marijuana     Comment: occasionally/rarely   • Sexual activity: Yes     Partners: Male         Current Outpatient Medications:   •  ALPRAZolam (XANAX) 1 mg tablet, TAKE 1 TABLET BY MOUTH THREE TIMES DAILY AND AN ADDITIONAL HALF TABLET IF NEEDED DAILY, Disp: , Rfl:   •  Dextromethorphan-buPROPion ER (Auvelity)  MG TBCR, Take by mouth Patient taking 45 mg once daily. , Disp: , Rfl:   •  ergocalciferol (VITAMIN D2) 50,000 units, TAKE 1 CAPSULE BY MOUTH ONE TIME PER WEEK, Disp: 12 capsule, Rfl: 1  •  levothyroxine 75 mcg tablet, TAKE 1 TABLET BY MOUTH EVERY DAY, Disp: 90 tablet, Rfl: 2  •  lisinopril (ZESTRIL) 10 mg tablet, TAKE 1 TABLET DAILY, Disp: 90 tablet, Rfl: 2    No Known Allergies    Physical Exam:    /83   Pulse (!) 53   Ht 5' 5" (1.651 m)   Wt 70.8 kg (156 lb)   LMP  (LMP Unknown)   BMI 25.96 kg/m²     Constitutional: normal, well developed, well nourished, alert, in no distress and non-toxic and no overt pain behavior.   Eyes: anicteric  HEENT: grossly intact  Neck: supple, symmetric, trachea midline and no masses   Pulmonary:even and unlabored  Cardiovascular:No edema or pitting edema present  Skin:Normal without rashes or lesions and well hydrated  Psychiatric:Mood and affect appropriate  Neurologic:Cranial Nerves II-XII grossly intact  Musculoskeletal: Pain in bilateral groins with logrolling.   Stinchfield test induces groin pain bilaterally    Lumbar Spine Exam    Appearance:  Normal lordosis  Palpation/Tenderness:  right lumbar paraspinal tenderness  Sensory:  no sensory deficits noted  Range of Motion:  Extension:  Minimally limited  with pain  Motor Strength:  Left hip flexion:  5/5  Left hip extension:  5/5  Right hip flexion:  5/5  Right hip extension:  5/5  Left knee flexion:  5/5  Left knee extension:  5/5  Right knee flexion:  5/5  Right knee extension:  5/5  Left foot dorsiflexion:  5/5  Left foot plantar flexion:  5/5  Right foot dorsiflexion:  5/5  Right foot plantar flexion:  5/5  Reflexes:  Left Patellar:  2+   Right Patellar:  2+   Left Achilles:  2+   Right Achilles:  2+   Special Tests:  Lumbar facet loading positive on the right    Imaging  XR hips bilateral 2 vw w pelvis if performed    (Results Pending)   X-ray lumbar spine 2 or 3 views    (Results Pending)       Orders Placed This Encounter   Procedures   • XR hips bilateral 2 vw w pelvis if performed   • X-ray lumbar spine 2 or 3 views

## 2023-09-13 RX ORDER — LIDOCAINE HYDROCHLORIDE 10 MG/ML
0.5 INJECTION, SOLUTION EPIDURAL; INFILTRATION; INTRACAUDAL; PERINEURAL ONCE AS NEEDED
Status: CANCELLED | OUTPATIENT
Start: 2023-09-13

## 2023-09-13 RX ORDER — SODIUM CHLORIDE, SODIUM LACTATE, POTASSIUM CHLORIDE, CALCIUM CHLORIDE 600; 310; 30; 20 MG/100ML; MG/100ML; MG/100ML; MG/100ML
50 INJECTION, SOLUTION INTRAVENOUS CONTINUOUS
Status: CANCELLED | OUTPATIENT
Start: 2023-09-13

## 2023-09-14 ENCOUNTER — TELEPHONE (OUTPATIENT)
Dept: GASTROENTEROLOGY | Facility: CLINIC | Age: 58
End: 2023-09-14

## 2023-09-14 ENCOUNTER — HOSPITAL ENCOUNTER (OUTPATIENT)
Dept: GASTROENTEROLOGY | Facility: HOSPITAL | Age: 58
Setting detail: OUTPATIENT SURGERY
End: 2023-09-14
Payer: COMMERCIAL

## 2023-09-14 ENCOUNTER — ANESTHESIA (OUTPATIENT)
Dept: GASTROENTEROLOGY | Facility: HOSPITAL | Age: 58
End: 2023-09-14

## 2023-09-14 ENCOUNTER — ANESTHESIA EVENT (OUTPATIENT)
Dept: GASTROENTEROLOGY | Facility: HOSPITAL | Age: 58
End: 2023-09-14

## 2023-09-14 VITALS
TEMPERATURE: 97.2 F | SYSTOLIC BLOOD PRESSURE: 155 MMHG | HEART RATE: 67 BPM | RESPIRATION RATE: 16 BRPM | HEIGHT: 65 IN | OXYGEN SATURATION: 98 % | BODY MASS INDEX: 26.56 KG/M2 | DIASTOLIC BLOOD PRESSURE: 93 MMHG | WEIGHT: 159.39 LBS

## 2023-09-14 DIAGNOSIS — K59.09 CHRONIC CONSTIPATION: ICD-10-CM

## 2023-09-14 DIAGNOSIS — R10.30 LOWER ABDOMINAL PAIN: ICD-10-CM

## 2023-09-14 DIAGNOSIS — R19.8 RECTAL DISCHARGE: ICD-10-CM

## 2023-09-14 RX ORDER — LIDOCAINE HYDROCHLORIDE 10 MG/ML
INJECTION, SOLUTION EPIDURAL; INFILTRATION; INTRACAUDAL; PERINEURAL AS NEEDED
Status: DISCONTINUED | OUTPATIENT
Start: 2023-09-14 | End: 2023-09-14

## 2023-09-14 RX ORDER — GLYCOPYRROLATE 0.2 MG/ML
INJECTION INTRAMUSCULAR; INTRAVENOUS AS NEEDED
Status: DISCONTINUED | OUTPATIENT
Start: 2023-09-14 | End: 2023-09-14

## 2023-09-14 RX ORDER — PROPOFOL 10 MG/ML
INJECTION, EMULSION INTRAVENOUS AS NEEDED
Status: DISCONTINUED | OUTPATIENT
Start: 2023-09-14 | End: 2023-09-14

## 2023-09-14 RX ORDER — SODIUM CHLORIDE, SODIUM LACTATE, POTASSIUM CHLORIDE, CALCIUM CHLORIDE 600; 310; 30; 20 MG/100ML; MG/100ML; MG/100ML; MG/100ML
50 INJECTION, SOLUTION INTRAVENOUS CONTINUOUS
Status: DISCONTINUED | OUTPATIENT
Start: 2023-09-14 | End: 2023-09-18 | Stop reason: HOSPADM

## 2023-09-14 RX ORDER — LIDOCAINE HYDROCHLORIDE 10 MG/ML
0.5 INJECTION, SOLUTION EPIDURAL; INFILTRATION; INTRACAUDAL; PERINEURAL ONCE AS NEEDED
Status: DISCONTINUED | OUTPATIENT
Start: 2023-09-14 | End: 2023-09-18 | Stop reason: HOSPADM

## 2023-09-14 RX ORDER — SODIUM CHLORIDE, SODIUM LACTATE, POTASSIUM CHLORIDE, CALCIUM CHLORIDE 600; 310; 30; 20 MG/100ML; MG/100ML; MG/100ML; MG/100ML
INJECTION, SOLUTION INTRAVENOUS CONTINUOUS PRN
Status: DISCONTINUED | OUTPATIENT
Start: 2023-09-14 | End: 2023-09-14

## 2023-09-14 RX ADMIN — PROPOFOL 140 MG: 10 INJECTION, EMULSION INTRAVENOUS at 07:33

## 2023-09-14 RX ADMIN — LIDOCAINE HYDROCHLORIDE 20 MG: 10 INJECTION, SOLUTION EPIDURAL; INFILTRATION; INTRACAUDAL; PERINEURAL at 07:33

## 2023-09-14 RX ADMIN — PROPOFOL 20 MG: 10 INJECTION, EMULSION INTRAVENOUS at 07:43

## 2023-09-14 RX ADMIN — PROPOFOL 20 MG: 10 INJECTION, EMULSION INTRAVENOUS at 07:39

## 2023-09-14 RX ADMIN — PROPOFOL 20 MG: 10 INJECTION, EMULSION INTRAVENOUS at 07:35

## 2023-09-14 RX ADMIN — PROPOFOL 20 MG: 10 INJECTION, EMULSION INTRAVENOUS at 07:41

## 2023-09-14 RX ADMIN — GLYCOPYRROLATE 0.1 MCG: 0.2 INJECTION, SOLUTION INTRAMUSCULAR; INTRAVENOUS at 07:33

## 2023-09-14 RX ADMIN — PROPOFOL 40 MG: 10 INJECTION, EMULSION INTRAVENOUS at 07:37

## 2023-09-14 RX ADMIN — GLYCOPYRROLATE 0.1 MCG: 0.2 INJECTION, SOLUTION INTRAMUSCULAR; INTRAVENOUS at 07:31

## 2023-09-14 RX ADMIN — SODIUM CHLORIDE, SODIUM LACTATE, POTASSIUM CHLORIDE, AND CALCIUM CHLORIDE: .6; .31; .03; .02 INJECTION, SOLUTION INTRAVENOUS at 07:20

## 2023-09-14 NOTE — H&P
History and Physical - [facility]  Belle Villaseñor 62 y.o. female MRN: [id number]                  HPI: Belle Villaseñor is a 62y.o. year old female who presents for colonoscopy for mucus and hemorrhoids and bleeding      REVIEW OF SYSTEMS: Per the HPI, and otherwise unremarkable. Historical Information   Past Medical History:   Diagnosis Date   • Anxiety    • Arthritis    • Colon polyp    • Depression    • Fibromyalgia, primary    • H/O  section    • History of gastric surgery     for morbid obesity   • History of total hip replacement    • Hypertension    • Hypothyroidism    • Portal vein thrombosis      Past Surgical History:   Procedure Laterality Date   •  SECTION      two   • CHOLECYSTECTOMY     • COLONOSCOPY     • GASTRIC BYPASS     • HIP SURGERY Bilateral     total hip replacement   • JOINT REPLACEMENT Bilateral     hips   • NE ESOPHAGOGASTRODUODENOSCOPY TRANSORAL DIAGNOSTIC N/A 2017    Procedure: EGD AND COLONOSCOPY;  Surgeon: Gino Leigh MD;  Location: [facility];   Service: Gastroenterology   • NE LAPS SURG CHOLECYSTECTOMY [facility]ene [facility]ast N/A 2017    Procedure: LAPAROSCOPIC CHOLECYSTECTOMY WITH IOC ;  Surgeon: Eduardo Strickland MD;  [facility];  Service: General   • WISDOM TOOTH EXTRACTION       Social History   Social History     Substance and Sexual Activity   Alcohol Use Not Currently    Comment: rarely     Social History     Substance and Sexual Activity   Drug Use Yes   • Frequency: 3.0 times per week   • Types: Marijuana    Comment: occasionally/rarely     Social History     Tobacco Use   Smoking Status Former   • Types: E-Cigarettes   • Quit date: 2012   • Years since quittin.5   Smokeless Tobacco Never   Tobacco Comments    vapes- on/off     Family History   Problem Relation Age of Onset   • Hypertension Mother    • Cervical cancer Mother    • Other Mother         value replacement   • Heart disease Father    • Hypertension Father    • Stroke Father    • Coronary artery disease Father         multiple vessel   • Hypertension Brother    • Hypertension Brother    • Hypertension Brother        Meds/Allergies     (Not in a hospital admission)      No Known Allergies    Objective     Blood pressure 128/80, pulse 55, temperature 97.7 °F (36.5 °C), temperature source Temporal, resp. rate 16, height 5' 5" (1.651 m), weight 72.3 kg (159 lb 6.3 oz), SpO2 97 %. PHYSICAL EXAM    /80   Pulse 55   Temp 97.7 °F (36.5 °C) (Temporal)   Resp 16   Ht 5' 5" (1.651 m)   Wt 72.3 kg (159 lb 6.3 oz)   LMP  (LMP Unknown)   SpO2 97%   BMI 26.52 kg/m²       Gen: NAD  CV: RRR  CHEST: Clear  ABD: soft, NT/ND  EXT: no edema      ASSESSMENT/PLAN:  This is a 62y.o. year old female here for colonoscopy, and she is stable and optimized for her procedure. History and Physical - [facility]  Sukhi Redmond 62 y.o. female MRN: [id number]                  HPI: Sukhi Redmond is a 62y.o. year old female who presents for colonoscopy for constipation and history of polyp and mucus in the hemorrhoids      REVIEW OF SYSTEMS: Per the HPI, and otherwise unremarkable. Historical Information   Past Medical History:   Diagnosis Date   • Anxiety    • Arthritis    • Colon polyp    • Depression    • Fibromyalgia, primary    • H/O  section    • History of gastric surgery     for morbid obesity   • History of total hip replacement    • Hypertension    • Hypothyroidism    • Portal vein thrombosis      Past Surgical History:   Procedure Laterality Date   •  SECTION      two   • CHOLECYSTECTOMY     • COLONOSCOPY     • GASTRIC BYPASS     • HIP SURGERY Bilateral     total hip replacement   • JOINT REPLACEMENT Bilateral     hips   • [place] ESOPHAGOGASTRODUODENOSCOPY TRANSORAL DIAGNOSTIC N/A 2017    Procedure: EGD AND COLONOSCOPY;  Surgeon: Radha Angeles MD;  Location[facility];   Service: Gastroenterology   • [place] LAPS SURG CHOLECYSTECTOMY [facility] N/A 2017    Procedure: LAPAROSCOPIC CHOLECYSTECTOMY WITH IOC ;  Surgeon: Aide Acosta MD;  [facility];  Service: General   • WISDOM TOOTH EXTRACTION       Social History   Social History     Substance and Sexual Activity   Alcohol Use Not Currently    Comment: rarely     Social History     Substance and Sexual Activity   Drug Use Yes   • Frequency: 3.0 times per week   • Types: Marijuana    Comment: occasionally/rarely     Social History     Tobacco Use   Smoking Status Former   • Types: E-Cigarettes   • Quit date: 2012   • Years since quittin.5   Smokeless Tobacco Never   Tobacco Comments    vapes- on/off     Family History   Problem Relation Age of Onset   • Hypertension Mother    • Cervical cancer Mother    • Other Mother         value replacement   • Heart disease Father    • Hypertension Father    • Stroke Father    • Coronary artery disease Father         multiple vessel   • Hypertension Brother    • Hypertension Brother    • Hypertension Brother        Meds/Allergies     (Not in a hospital admission)      No Known Allergies    Objective     Blood pressure 128/80, pulse 55, temperature 97.7 °F (36.5 °C), temperature source Temporal, resp. rate 16, height 5' 5" (1.651 m), weight 72.3 kg (159 lb 6.3 oz), SpO2 97 %. PHYSICAL EXAM    /80   Pulse 55   Temp 97.7 °F (36.5 °C) (Temporal)   Resp 16   Ht 5' 5" (1.651 m)   Wt 72.3 kg (159 lb 6.3 oz)   LMP  (LMP Unknown)   SpO2 97%   BMI 26.52 kg/m²       Gen: NAD  CV: RRR  CHEST: Clear  ABD: soft, NT/ND  EXT: no edema      ASSESSMENT/PLAN:  This is a 62y.o. year old female here for colonoscopy, and she is stable and optimized for her procedure.

## 2023-09-14 NOTE — TELEPHONE ENCOUNTER
----- Message from Yokasta Green MD sent at 9/14/2023  8:19 AM EDT -----  Please refer her to colorectal surgery and help her get an appointment for external hemorrhoids.

## 2023-09-14 NOTE — ANESTHESIA POSTPROCEDURE EVALUATION
Post-Op Assessment Note    CV Status:  Stable  Pain Score: 0    Pain management: adequate     Mental Status:  Arousable and sleepy   Hydration Status:  Euvolemic   PONV Controlled:  Controlled   Airway Patency:  Patent      Post Op Vitals Reviewed: Yes      Staff: CRNA         No notable events documented.     BP   154/94   Temp      Pulse 64   Resp 18   SpO2 98% RA

## 2023-09-14 NOTE — TELEPHONE ENCOUNTER
----- Message from Mason Seth MD sent at 9/14/2023  8:19 AM EDT -----  Please refer her to colorectal surgery and help her get an appointment for external hemorrhoids.

## 2023-09-14 NOTE — TELEPHONE ENCOUNTER
----- Message from Cody Elena MD sent at 9/14/2023  8:19 AM EDT -----  Please refer her to colorectal surgery and help her get an appointment for external hemorrhoids.

## 2023-09-14 NOTE — ANESTHESIA PREPROCEDURE EVALUATION
Procedure:  COLONOSCOPY    Relevant Problems   CARDIO   (+) Hypertension      ENDO   (+) Hypothyroidism      HEMATOLOGY   (+) Iron deficiency anemia secondary to inadequate dietary iron intake      MUSCULOSKELETAL   (+) Arthritis   (+) Fibromyalgia, primary   (+) Lumbar pain   (+) Primary osteoarthritis involving multiple joints   (+) Seronegative arthritis of joint of spine      NEURO/PSYCH   (+) Chronic pain syndrome   (+) Depression   (+) Fibromyalgia, primary      Annita-en-Y gastric bypass surgery done in 2002 for maximum weight 260 lb. Complication of care was development of portal vein thrombosis. Cormack I with Mac 3 on 2/24/17    Confirmed NPO appropriate    Denies recent fever, cough or other symptom of upper respiratory tract infection. Physical Exam    Airway    Mallampati score: II  TM Distance: >3 FB  Neck ROM: full     Dental   No notable dental hx     Cardiovascular      Pulmonary      Other Findings        Anesthesia Plan  ASA Score- 3     Anesthesia Type- IV sedation with anesthesia with ASA Monitors. Additional Monitors:   Airway Plan:     Comment: I discussed the risks and benefits of IV sedation anesthesia including the possibility of the need to convert to general anesthesia and the potential risk of awareness. Plan Factors-Exercise tolerance (METS): >4 METS. Exercise comment: Able to climb two flights of stairs without cardiopulmonary limitation. Chart reviewed. Existing labs reviewed. Patient is a current smoker (Vapes, last this morning). Patient smoked on day of surgery. Induction- intravenous. Postoperative Plan-     Informed Consent- Anesthetic plan and risks discussed with patient.

## 2023-09-22 ENCOUNTER — TELEPHONE (OUTPATIENT)
Dept: RADIOLOGY | Facility: CLINIC | Age: 58
End: 2023-09-22

## 2023-09-22 NOTE — TELEPHONE ENCOUNTER
----- Message from Stella Lazo MD sent at 9/22/2023 12:44 PM EDT -----  Aunchase Gonzalez shows arthritis in the lower lumbar spine

## 2023-09-22 NOTE — TELEPHONE ENCOUNTER
----- Message from Radu Jonas MD sent at 9/22/2023 12:44 PM EDT -----  Xray of both hips shows hardware is intact with no issues. There are no fractures or dislocations.

## 2023-09-27 NOTE — TELEPHONE ENCOUNTER
Caller: Priya Zee     Doctor: Dr Abrams Fair for call: Patient returning call from RN but unable to  phone at work is available until 11 am or 3-to 3:30pm     Call back#: [phone]

## 2023-09-27 NOTE — TELEPHONE ENCOUNTER
S/W pt and advised of results    Pt states she is wanting to know what is causing pain she is having? Pain in both groins areas, mostly at night when she is getting out of bed to go to the bathroom  Still, she is fine  Also c/o intermittent cramping of entire leg that starts from her thighs   States it wakes her up and is excruciating if she does not catch it.   States muscles cramp up and will not release    Please advise

## 2023-11-05 DIAGNOSIS — D50.8 IRON DEFICIENCY ANEMIA SECONDARY TO INADEQUATE DIETARY IRON INTAKE: ICD-10-CM

## 2023-11-05 DIAGNOSIS — K90.9 MALABSORPTION SYNDROME: ICD-10-CM

## 2023-11-05 DIAGNOSIS — E55.9 VITAMIN D DEFICIENCY: ICD-10-CM

## 2023-11-05 RX ORDER — ERGOCALCIFEROL 1.25 MG/1
CAPSULE ORAL
Qty: 12 CAPSULE | Refills: 1 | Status: SHIPPED | OUTPATIENT
Start: 2023-11-05

## 2023-11-10 ENCOUNTER — TELEPHONE (OUTPATIENT)
Age: 58
End: 2023-11-10

## 2023-11-10 ENCOUNTER — OFFICE VISIT (OUTPATIENT)
Age: 58
End: 2023-11-10
Payer: COMMERCIAL

## 2023-11-10 VITALS
BODY MASS INDEX: 25.56 KG/M2 | DIASTOLIC BLOOD PRESSURE: 82 MMHG | HEIGHT: 65 IN | OXYGEN SATURATION: 99 % | HEART RATE: 60 BPM | SYSTOLIC BLOOD PRESSURE: 126 MMHG | WEIGHT: 153.4 LBS | TEMPERATURE: 98.2 F | RESPIRATION RATE: 18 BRPM

## 2023-11-10 DIAGNOSIS — G25.81 RESTLESS LEG SYNDROME: ICD-10-CM

## 2023-11-10 DIAGNOSIS — E03.9 HYPOTHYROIDISM, UNSPECIFIED TYPE: ICD-10-CM

## 2023-11-10 DIAGNOSIS — K90.9 MALABSORPTION SYNDROME: ICD-10-CM

## 2023-11-10 DIAGNOSIS — Z12.11 SCREENING FOR COLORECTAL CANCER: ICD-10-CM

## 2023-11-10 DIAGNOSIS — Z12.12 SCREENING FOR COLORECTAL CANCER: ICD-10-CM

## 2023-11-10 DIAGNOSIS — F32.A DEPRESSION, UNSPECIFIED DEPRESSION TYPE: ICD-10-CM

## 2023-11-10 DIAGNOSIS — Z13.220 ENCOUNTER FOR LIPID SCREENING FOR CARDIOVASCULAR DISEASE: ICD-10-CM

## 2023-11-10 DIAGNOSIS — I10 PRIMARY HYPERTENSION: ICD-10-CM

## 2023-11-10 DIAGNOSIS — E55.9 VITAMIN D INSUFFICIENCY: ICD-10-CM

## 2023-11-10 DIAGNOSIS — Z00.00 HEALTHCARE MAINTENANCE: Primary | Chronic | ICD-10-CM

## 2023-11-10 DIAGNOSIS — Z12.31 SCREENING MAMMOGRAM FOR BREAST CANCER: ICD-10-CM

## 2023-11-10 DIAGNOSIS — G89.4 CHRONIC PAIN SYNDROME: ICD-10-CM

## 2023-11-10 DIAGNOSIS — Z13.6 ENCOUNTER FOR LIPID SCREENING FOR CARDIOVASCULAR DISEASE: ICD-10-CM

## 2023-11-10 DIAGNOSIS — M19.90 ARTHRITIS: ICD-10-CM

## 2023-11-10 DIAGNOSIS — M79.7 FIBROMYALGIA, PRIMARY: ICD-10-CM

## 2023-11-10 DIAGNOSIS — Z72.0 TOBACCO USE: ICD-10-CM

## 2023-11-10 PROBLEM — E16.2 HYPOGLYCEMIA: Status: RESOLVED | Noted: 2022-02-16 | Resolved: 2023-11-10

## 2023-11-10 PROCEDURE — 99215 OFFICE O/P EST HI 40 MIN: CPT | Performed by: FAMILY MEDICINE

## 2023-11-10 PROCEDURE — 99396 PREV VISIT EST AGE 40-64: CPT | Performed by: FAMILY MEDICINE

## 2023-11-10 RX ORDER — NICOTINE 21 MG/24HR
1 PATCH, TRANSDERMAL 24 HOURS TRANSDERMAL EVERY 24 HOURS
Qty: 28 PATCH | Refills: 0 | Status: SHIPPED | OUTPATIENT
Start: 2023-11-10

## 2023-11-10 RX ORDER — MAGNESIUM OXIDE 400 MG/1
400 TABLET ORAL
Qty: 90 TABLET | Refills: 0 | Status: SHIPPED | OUTPATIENT
Start: 2023-11-10 | End: 2024-02-08

## 2023-11-10 NOTE — ASSESSMENT & PLAN NOTE
Levothyroxine 75 mcg early in the morning    Lab Results   Component Value Date    [id number] 1.590 02/16/2022     Follow up with TSH

## 2023-11-10 NOTE — PROGRESS NOTES
[facility] PRIMARY CARE  [address]  [place], [place]    NAME: Azul Ruiz   AGE: 62 y.o. SEX: female   : 1965          Assessment and Plan:     1. Healthcare maintenance  Assessment & Plan:  Health maintenance completed today. - Medical history reviewed, including existing medical conditions, medications, and surgeries. - Labs discussed to evaluate cholesterol, blood sugar, kidney function, liver function, and other important markers of health. - BMI evaluated and discussed. - Cancer screenings discussed: Mammogram/Pap smear/CT lung/colonoscopy . - Vaccination status reviewed and pertinent immunizations and booster shots discussed. - Bone health reviewed. - Skin examination: Discussed importance of sunscreen and other preventative measures for skin cancer.  - Lifestyle and health counseling completed including diet, exercise habits, smoking status, alcohol consumption.   - Mental health and wellbeing evaluated and discussed. - Family history obtained to identify any of hereditary health risks. Plan   Lab orders in place, f/u results. Preventative orders in place as recommended. Return in 6 months for chronic disease management. 2. Hypothyroidism, unspecified type  Assessment & Plan:  Levothyroxine 75 mcg early in the morning    Lab Results   Component Value Date    [id number] 1.590 2022     Follow up with TSH      Orders:  -     TSH, 3rd generation; Future; Expected date: 11/10/2023    3. Primary hypertension  Assessment & Plan:  Controlled on lisinopril 10 mg daily. Continue current regimen    Orders:  -     CBC and differential; Future; Expected date: 11/10/2023  -     Comprehensive metabolic panel; Future; Expected date: 11/10/2023    4.  Restless leg syndrome  Assessment & Plan:  Evaluate with labs including for iron deficiency anemia    Start magnesium to evaluate for improvement can take at nighttime to help with sleep    Orders:  -     magnesium oxide (MAG-OX) 400 mg tablet; Take 1 tablet (400 mg total) by mouth daily at bedtime    5. Tobacco use  -     nicotine (NICODERM CQ) 14 mg/24hr TD 24 hr patch; Place 1 patch on the skin over 24 hours every 24 hours    6. Encounter for lipid screening for cardiovascular disease  -     Lipid Panel with Direct LDL reflex; Future    7. Screening mammogram for breast cancer  -     Mammo screening bilateral w 3d & cad; Future; Expected date: 11/10/2023    8. Vitamin D insufficiency  -     Vitamin D 25 hydroxy; Future    9. Screening for colorectal cancer  -     Ambulatory referral to Gastroenterology; Future    10. Depression, unspecified depression type  Assessment & Plan:  Follows psych, currently on dextromethorphan-bupropion ER  daily and alprazolam 1 mg prn. Orders:  -     CBC and differential; Future; Expected date: 11/10/2023  -     Comprehensive metabolic panel; Future; Expected date: 11/10/2023    11. Chronic pain syndrome  Assessment & Plan:  Refer to pain specialist  Discussed yoga 3 times a week  Discussed avoiding NSAIDs for chronic use      12. Malabsorption syndrome  Assessment & Plan:  History of bariatric surgery, requesting for vitamin B12 levels    Orders:  -     Vitamin B12; Future    13. Fibromyalgia, primary  Assessment & Plan:  FMLA signed today for limitation of weightbearing at work      15. Arthritis  Assessment & Plan:  Signed FMLA forms for weight restriction           Immunizations and preventive care screenings were discussed with patient today. Appropriate education was printed on patient's after visit summary. BMI Counseling: Body mass index is 25.53 kg/m².  The BMI is above normal. Nutrition recommendations include decreasing portion sizes, encouraging healthy choices of fruits and vegetables, decreasing fast food intake, consuming healthier snacks, limiting drinks that contain sugar, moderation in carbohydrate intake, increasing intake of lean protein, reducing intake of saturated and trans fat and reducing intake of cholesterol. Exercise recommendations include moderate physical activity 150 minutes/week and strength training exercises. Rationale for BMI follow-up plan is due to patient being overweight or obese. Counseling:  Alcohol/drug use: discussed moderation in alcohol intake, the recommendations for healthy alcohol use, and avoidance of illicit drug use. Dental Health: discussed importance of regular tooth brushing, flossing, and dental visits. Injury prevention: discussed safety/seat belts, safety helmets, smoke detectors, carbon dioxide detectors, and smoking near bedding or upholstery. Sexual health: discussed sexually transmitted diseases, partner selection, use of condoms, avoidance of unintended pregnancy, and contraceptive alternatives. Exercise: the importance of regular exercise/physical activity was discussed. Recommend exercise 3-5 times per week for at least 30 minutes. Follow-up Plan:   Return in about 6 months (around 5/10/2024) for chronic disease management. Chief Complaint:   Not feeling well        History of Present Illness:     Adult Annual Physical   Qing Chavarria is here for a comprehensive physical exam.     Concerns   The patient reports problems - sciatic . Qing Chavarria is currently living with daughter who just moved back in who is working from home. . Reported education/occupation: retired from [employer], works for [employer] now. Diet and Physical Activity  Diet/Nutrition: poor diet and limited fruits/vegetables. Exercise: walking. General Health  Sleep: gets 4-6 hours of sleep on average. Hearing: normal - bilateral.  Vision: wears glasses and only for reading . Dental: regular dental visits and brushes teeth twice daily.        Depression Screening  PHQ-2/9 Depression Screening    Little interest or pleasure in doing things: 1 - several days  Feeling down, depressed, or hopeless: 1 - several days  Trouble falling or staying asleep, or sleeping too much: 1 - several days  Feeling tired or having little energy: 1 - several days  Poor appetite or overeatin - several days  Feeling bad about yourself - or that you are a failure or have let yourself or your family down: 1 - several days  Trouble concentrating on things, such as reading the newspaper or watching television: 3 - nearly every day  Moving or speaking so slowly that other people could have noticed. Or the opposite - being so fidgety or restless that you have been moving around a lot more than usual: 0 - not at all  Thoughts that you would be better off dead, or of hurting yourself in some way: 1 - several days  PHQ-9 Score: 10   PHQ-9 Interpretation: Moderate depression            BOBBY-7 Flowsheet Screening      Flowsheet Row Most Recent Value   Over the last 2 weeks, how often have you been bothered by any of the following problems? Feeling nervous, anxious, or on edge 3   Not being able to stop or control worrying 3   Worrying too much about different things 3   Trouble relaxing 0   Being so restless that it is hard to sit still 0   Becoming easily annoyed or irritable 1   Feeling afraid as if something awful might happen 2   BOBBY-7 Total Score 12            Anxiety: yes. Past thoughts of SI/SH: no.  Past trauma/significant events in life: yes. /GYN Health  Patient is: postmenopausal  Last menstrual period: over several years ago  Contraceptive method:  none . Social History     Substance and Sexual Activity   Sexual Activity Yes    Partners: Male       Others  Safety concerns: no.  Smoking history: yes. Quit smoking over 12 years ago, currently vaping. Recreational drugs: yes. Review of Systems:   Review of Systems   Constitutional:  Positive for fatigue. Negative for chills, fever and unexpected weight change.    HENT:  Negative for congestion, rhinorrhea and sore throat. Eyes:  Negative for visual disturbance. Respiratory:  Negative for chest tightness, shortness of breath and wheezing. Cardiovascular:  Negative for chest pain. Gastrointestinal:  Negative for abdominal pain, constipation, diarrhea, nausea and vomiting. Endocrine: Negative for polyuria. Genitourinary:  Negative for dysuria and hematuria. Musculoskeletal:  Positive for arthralgias and back pain. Skin:  Negative for rash. Neurological:  Negative for dizziness, weakness, light-headedness and headaches. Psychiatric/Behavioral:  Negative for confusion. Past Medical History:     Past Medical History:   Diagnosis Date    Anxiety     Arthritis     Colon polyp     Depression     Fibromyalgia, primary     H/O  section     History of gastric surgery     for morbid obesity    History of total hip replacement     Hypertension     Hypothyroidism     Portal vein thrombosis         Past Surgical History:     Past Surgical History:   Procedure Laterality Date     SECTION      two    CHOLECYSTECTOMY      COLONOSCOPY      GASTRIC BYPASS      HIP SURGERY Bilateral     total hip replacement    JOINT REPLACEMENT Bilateral     hips    AK ESOPHAGOGASTRODUODENOSCOPY TRANSORAL DIAGNOSTIC N/A 2017    Procedure: EGD AND COLONOSCOPY;  Surgeon: Ben Ferguson MD;  Location: [facility];   Service: Gastroenterology    AK LAPS SURG CHOLECYSTECTOMY [facility] N/A 2017    Procedure: LAPAROSCOPIC CHOLECYSTECTOMY WITH IOC ;  Surgeon: Maximilian Alves MD;  Location: [facility];  Service: General    WISDOM TOOTH EXTRACTION          Family History:     Family History   Problem Relation Age of Onset    Hypertension Mother     Cervical cancer Mother     Other Mother         value replacement    Heart disease Father     Hypertension Father     Stroke Father     Coronary artery disease Father         multiple vessel    Hypertension Brother     Hypertension Brother     Hypertension Brother         Social History:    reports that she quit smoking about 11 years ago. Her smoking use included e-cigarettes. She has never used smokeless tobacco. She reports that she does not currently use alcohol. She reports current drug use. Frequency: 3.00 times per week. Drug: Marijuana. Current Medications:     Current Outpatient Medications:     ALPRAZolam (XANAX) 1 mg tablet, TAKE 1 TABLET BY MOUTH THREE TIMES DAILY AND AN ADDITIONAL HALF TABLET IF NEEDED DAILY, Disp: , Rfl:     Dextromethorphan-buPROPion ER (Auvelity)  MG TBCR, Take by mouth Patient taking 45 mg once daily. , Disp: , Rfl:     ergocalciferol (VITAMIN D2) 50,000 units, TAKE 1 CAPSULE BY MOUTH ONE TIME PER WEEK, Disp: 12 capsule, Rfl: 1    levothyroxine 75 mcg tablet, TAKE 1 TABLET BY MOUTH EVERY DAY, Disp: 90 tablet, Rfl: 2    lisinopril (ZESTRIL) 10 mg tablet, TAKE 1 TABLET DAILY, Disp: 90 tablet, Rfl: 2    magnesium oxide (MAG-OX) 400 mg tablet, Take 1 tablet (400 mg total) by mouth daily at bedtime, Disp: 90 tablet, Rfl: 0    nicotine (NICODERM CQ) 14 mg/24hr TD 24 hr patch, Place 1 patch on the skin over 24 hours every 24 hours, Disp: 28 patch, Rfl: 0     Allergies:   No Known Allergies      Physical Exam:     /82 (BP Location: Left arm, Patient Position: Sitting, Cuff Size: Standard)   Pulse 60   Temp 98.2 °F (36.8 °C) (Tympanic)   Resp 18   Ht 5' 5" (1.651 m)   Wt 69.6 kg (153 lb 6.4 oz)   LMP  (LMP Unknown)   SpO2 99%   BMI 25.53 kg/m²      Physical Exam  Vitals reviewed. Constitutional:       General: She is not in acute distress. Appearance: Normal appearance. She is not ill-appearing, toxic-appearing or diaphoretic. HENT:      Head: Normocephalic and atraumatic. Right Ear: External ear normal.      Left Ear: External ear normal.      Nose: Nose normal.      Mouth/Throat:      Mouth: Mucous membranes are moist.   Eyes:      General: No scleral icterus.         Right eye: No discharge. Left eye: No discharge. Extraocular Movements: Extraocular movements intact. Conjunctiva/sclera: Conjunctivae normal.   Cardiovascular:      Rate and Rhythm: Normal rate and regular rhythm. Pulses: Normal pulses. Heart sounds: Normal heart sounds. Pulmonary:      Effort: Pulmonary effort is normal. No respiratory distress. Breath sounds: Normal breath sounds. Abdominal:      Palpations: Abdomen is soft. Tenderness: There is no abdominal tenderness. Musculoskeletal:         General: No swelling. Normal range of motion. Cervical back: Normal range of motion. Skin:     General: Skin is warm and dry. Neurological:      General: No focal deficit present. Mental Status: She is alert and oriented to person, place, and time. Psychiatric:         Mood and Affect: Mood normal.         Behavior: Behavior normal.         Thought Content:  Thought content normal.           Ioana Palma MD  Family Medicine Physician   [facility]

## 2023-11-10 NOTE — ASSESSMENT & PLAN NOTE
Evaluate with labs including for iron deficiency anemia    Start magnesium to evaluate for improvement can take at nighttime to help with sleep

## 2023-11-10 NOTE — ASSESSMENT & PLAN NOTE
Health maintenance completed today. - Medical history reviewed, including existing medical conditions, medications, and surgeries. - Labs discussed to evaluate cholesterol, blood sugar, kidney function, liver function, and other important markers of health. - BMI evaluated and discussed. - Cancer screenings discussed: Mammogram/Pap smear/CT lung/colonoscopy . - Vaccination status reviewed and pertinent immunizations and booster shots discussed. - Bone health reviewed. - Skin examination: Discussed importance of sunscreen and other preventative measures for skin cancer.  - Lifestyle and health counseling completed including diet, exercise habits, smoking status, alcohol consumption.   - Mental health and wellbeing evaluated and discussed. - Family history obtained to identify any of hereditary health risks. Plan   Lab orders in place, f/u results. Preventative orders in place as recommended. Return in 6 months for chronic disease management.

## 2023-11-18 LAB
25(OH)D3 SERPL-MCNC: 70 NG/ML (ref 30–100)
ALBUMIN SERPL-MCNC: 4.3 G/DL (ref 3.6–5.1)
ALBUMIN/GLOB SERPL: 2 (CALC) (ref 1–2.5)
ALP SERPL-CCNC: 115 U/L (ref 37–153)
ALT SERPL-CCNC: 13 U/L (ref 6–29)
AST SERPL-CCNC: 17 U/L (ref 10–35)
BASOPHILS # BLD AUTO: 29 CELLS/UL (ref 0–200)
BASOPHILS NFR BLD AUTO: 0.7 %
BILIRUB SERPL-MCNC: 0.9 MG/DL (ref 0.2–1.2)
BUN SERPL-MCNC: 13 MG/DL (ref 7–25)
BUN/CREAT SERPL: ABNORMAL (CALC) (ref 6–22)
CALCIUM SERPL-MCNC: 9.7 MG/DL (ref 8.6–10.4)
CHLORIDE SERPL-SCNC: 106 MMOL/L (ref 98–110)
CHOLEST SERPL-MCNC: 200 MG/DL
CHOLEST/HDLC SERPL: 2.9 (CALC)
CO2 SERPL-SCNC: 28 MMOL/L (ref 20–32)
CREAT SERPL-MCNC: 0.84 MG/DL (ref 0.5–1.03)
EOSINOPHIL # BLD AUTO: 80 CELLS/UL (ref 15–500)
EOSINOPHIL NFR BLD AUTO: 1.9 %
ERYTHROCYTE [DISTWIDTH] IN BLOOD BY AUTOMATED COUNT: 13 % (ref 11–15)
GFR/BSA.PRED SERPLBLD CYS-BASED-ARV: 81 ML/MIN/1.73M2
GLOBULIN SER CALC-MCNC: 2.2 G/DL (CALC) (ref 1.9–3.7)
GLUCOSE SERPL-MCNC: 107 MG/DL (ref 65–99)
HCT VFR BLD AUTO: 43 % (ref 35–45)
HDLC SERPL-MCNC: 69 MG/DL
HGB BLD-MCNC: 14.3 G/DL (ref 11.7–15.5)
LDLC SERPL CALC-MCNC: 115 MG/DL (CALC)
LYMPHOCYTES # BLD AUTO: 806 CELLS/UL (ref 850–3900)
LYMPHOCYTES NFR BLD AUTO: 19.2 %
MCH RBC QN AUTO: 28.8 PG (ref 27–33)
MCHC RBC AUTO-ENTMCNC: 33.3 G/DL (ref 32–36)
MCV RBC AUTO: 86.7 FL (ref 80–100)
MONOCYTES # BLD AUTO: 290 CELLS/UL (ref 200–950)
MONOCYTES NFR BLD AUTO: 6.9 %
NEUTROPHILS # BLD AUTO: 2995 CELLS/UL (ref 1500–7800)
NEUTROPHILS NFR BLD AUTO: 71.3 %
NONHDLC SERPL-MCNC: 131 MG/DL (CALC)
PLATELET # BLD AUTO: 205 THOUSAND/UL (ref 140–400)
PMV BLD REES-ECKER: 10.5 FL (ref 7.5–12.5)
POTASSIUM SERPL-SCNC: 4.8 MMOL/L (ref 3.5–5.3)
PROT SERPL-MCNC: 6.5 G/DL (ref 6.1–8.1)
RBC # BLD AUTO: 4.96 MILLION/UL (ref 3.8–5.1)
SODIUM SERPL-SCNC: 141 MMOL/L (ref 135–146)
TRIGL SERPL-MCNC: 69 MG/DL
TSH SERPL-ACNC: 0.78 MIU/L (ref 0.4–4.5)
VIT B12 SERPL-MCNC: 488 PG/ML (ref 200–1100)
WBC # BLD AUTO: 4.2 THOUSAND/UL (ref 3.8–10.8)

## 2023-12-12 DIAGNOSIS — Z72.0 TOBACCO USE: ICD-10-CM

## 2023-12-12 RX ORDER — NICOTINE 21 MG/24HR
1 PATCH, TRANSDERMAL 24 HOURS TRANSDERMAL EVERY 24 HOURS
Qty: 28 PATCH | Refills: 0 | Status: SHIPPED | OUTPATIENT
Start: 2023-12-12

## 2024-01-07 DIAGNOSIS — Z72.0 TOBACCO USE: ICD-10-CM

## 2024-01-08 RX ORDER — NICOTINE 21 MG/24HR
1 PATCH, TRANSDERMAL 24 HOURS TRANSDERMAL EVERY 24 HOURS
Qty: 28 PATCH | Refills: 0 | Status: SHIPPED | OUTPATIENT
Start: 2024-01-08

## 2024-03-02 DIAGNOSIS — G25.81 RESTLESS LEG SYNDROME: ICD-10-CM

## 2024-03-04 RX ORDER — MAGNESIUM OXIDE 400 MG/1
400 TABLET ORAL
Qty: 90 TABLET | Refills: 0 | Status: SHIPPED | OUTPATIENT
Start: 2024-03-04 | End: 2024-06-02

## 2024-03-19 DIAGNOSIS — Z72.0 TOBACCO USE: ICD-10-CM

## 2024-03-19 RX ORDER — NICOTINE 21 MG/24HR
1 PATCH, TRANSDERMAL 24 HOURS TRANSDERMAL EVERY 24 HOURS
Qty: 28 PATCH | Refills: 0 | Status: SHIPPED | OUTPATIENT
Start: 2024-03-19

## 2024-06-06 DIAGNOSIS — G25.81 RESTLESS LEG SYNDROME: ICD-10-CM

## 2024-06-06 RX ORDER — MAGNESIUM OXIDE 400 MG/1
400 TABLET ORAL
Qty: 90 TABLET | Refills: 0 | Status: SHIPPED | OUTPATIENT
Start: 2024-06-06 | End: 2024-09-04

## 2024-06-09 DIAGNOSIS — I10 ESSENTIAL HYPERTENSION: ICD-10-CM

## 2024-06-10 RX ORDER — LEVOTHYROXINE SODIUM 0.07 MG/1
75 TABLET ORAL DAILY
Qty: 90 TABLET | Refills: 0 | Status: SHIPPED | OUTPATIENT
Start: 2024-06-10

## 2024-07-19 ENCOUNTER — OFFICE VISIT (OUTPATIENT)
Age: 59
End: 2024-07-19
Payer: COMMERCIAL

## 2024-07-19 VITALS
HEART RATE: 68 BPM | HEIGHT: 65 IN | BODY MASS INDEX: 25.66 KG/M2 | SYSTOLIC BLOOD PRESSURE: 116 MMHG | TEMPERATURE: 96.8 F | OXYGEN SATURATION: 98 % | WEIGHT: 154 LBS | RESPIRATION RATE: 14 BRPM | DIASTOLIC BLOOD PRESSURE: 60 MMHG

## 2024-07-19 DIAGNOSIS — Z72.0 TOBACCO USE: ICD-10-CM

## 2024-07-19 DIAGNOSIS — G25.81 RESTLESS LEG SYNDROME: ICD-10-CM

## 2024-07-19 DIAGNOSIS — E03.9 ACQUIRED HYPOTHYROIDISM: ICD-10-CM

## 2024-07-19 DIAGNOSIS — E55.9 VITAMIN D INSUFFICIENCY: ICD-10-CM

## 2024-07-19 DIAGNOSIS — M79.7 FIBROMYALGIA, PRIMARY: ICD-10-CM

## 2024-07-19 DIAGNOSIS — Z12.31 ENCOUNTER FOR SCREENING MAMMOGRAM FOR BREAST CANCER: ICD-10-CM

## 2024-07-19 DIAGNOSIS — G89.4 CHRONIC PAIN SYNDROME: ICD-10-CM

## 2024-07-19 DIAGNOSIS — E78.5 DYSLIPIDEMIA: ICD-10-CM

## 2024-07-19 DIAGNOSIS — Z12.4 SCREENING FOR CERVICAL CANCER: Primary | ICD-10-CM

## 2024-07-19 DIAGNOSIS — Z78.0 ASYMPTOMATIC UNCOMPLICATED MENOPAUSE: ICD-10-CM

## 2024-07-19 DIAGNOSIS — F33.1 MODERATE EPISODE OF RECURRENT MAJOR DEPRESSIVE DISORDER (HCC): ICD-10-CM

## 2024-07-19 DIAGNOSIS — I10 PRIMARY HYPERTENSION: ICD-10-CM

## 2024-07-19 DIAGNOSIS — Z96.643 HISTORY OF TOTAL REPLACEMENT OF BOTH HIP JOINTS: ICD-10-CM

## 2024-07-19 DIAGNOSIS — R73.9 ELEVATED SERUM GLUCOSE: ICD-10-CM

## 2024-07-19 DIAGNOSIS — M54.50 LUMBAR PAIN: ICD-10-CM

## 2024-07-19 DIAGNOSIS — M19.90 ARTHRITIS: ICD-10-CM

## 2024-07-19 PROBLEM — D50.8 IRON DEFICIENCY ANEMIA SECONDARY TO INADEQUATE DIETARY IRON INTAKE: Status: RESOLVED | Noted: 2018-09-19 | Resolved: 2024-07-19

## 2024-07-19 PROBLEM — Z23 NEED FOR IMMUNIZATION AGAINST INFLUENZA: Status: RESOLVED | Noted: 2018-09-19 | Resolved: 2024-07-19

## 2024-07-19 PROBLEM — M25.559 HIP PAIN: Status: RESOLVED | Noted: 2022-09-15 | Resolved: 2024-07-19

## 2024-07-19 PROBLEM — Z00.00 HEALTHCARE MAINTENANCE: Chronic | Status: RESOLVED | Noted: 2023-11-10 | Resolved: 2024-07-19

## 2024-07-19 PROCEDURE — 99214 OFFICE O/P EST MOD 30 MIN: CPT | Performed by: FAMILY MEDICINE

## 2024-07-19 RX ORDER — NICOTINE 21 MG/24HR
1 PATCH, TRANSDERMAL 24 HOURS TRANSDERMAL EVERY 24 HOURS
Qty: 28 PATCH | Refills: 6 | Status: SHIPPED | OUTPATIENT
Start: 2024-07-19

## 2024-07-19 NOTE — PROGRESS NOTES
[facility] PRIMARY CARE  Ambulatory visit     Name: Gina Yo   YOB: 1965   MRN: [id number]  Encounter Provider: Ramirez Oh MD    Encounter Date: 7/19/2024    ASSESSMENT & PLAN    Assessment & Plan   Hypothyroidism  Lab Results   Component Value Date    [id number] 1.590 02/16/2022    TSH 0.78 11/17/2023   Levothyroxine 75 mcg early in the morning  Follow up with TSH  Patient was given print out on thyroidism     Primary hypertension  Blood Pressure: 116/60    Controlled on lisinopril 10 mg daily.   Continue current regimen     Restless leg syndrome  Continue magnesium to evaluate for improvement can take at nighttime to help with sleep    Tobacco use  Discussed importance of smoking cessation  Provided refill for nicotine patches     Depression, unspecified depression type  Follows psych, currently on dextromethorphan-bupropion ER  daily and alprazolam 1 mg prn.      Chronic pain syndrome/Fibromyalgia, primary/Arthritis/lumbar pain/total hip replacement  Refer to pain specialist  Referral in place for sports medicine  FMLA signed today for limitation of weightbearing at work     Malabsorption syndrome  History of bariatric surgery, requesting for vitamin B12 levels    Dyslipidemia  Currently not on statin.   Reviewed & discussed labs including lipid panel   Triglycerides/HDL ratio: 1. Goal <2, 11/17/2023   Discussed importance of nutritional intake, exercising regularly.  Elevated , at goal HDL and trig 69,69.   Advised improving nutritional intake and building good habits as discussed  Goal of <70 LDL, <100 triglycerides, and >60 HDL  Monitor lipid panel every 6-12 months.           Depression Screening and Follow-up Plan: Patient's depression screening was positive with a PHQ-9 score of 10. Patient assessed for underlying major depression. Brief counseling provided and recommend additional follow-up/re-evaluation next office visit.     Tobacco Cessation  Counseling: Tobacco cessation counseling was provided. The patient is sincerely urged to quit consumption of tobacco. She is ready to quit tobacco. Medication options and side effects of medication discussed. Patient agreed to medication. Nicotine patch was prescribed.        DIAGNOSIS & ORDERS   1. Screening for cervical cancer  -     Ambulatory referral to Obstetrics / Gynecology; Future  2. Encounter for screening mammogram for breast cancer  3. Acquired hypothyroidism  -     TSH + Free T4; Future; Expected date: 07/19/2024  4. Primary hypertension  5. Lumbar pain  -     Ambulatory referral to Orthopedic Surgery; Future  6. Elevated serum glucose  -     Hemoglobin A1C; Future; Expected date: 07/19/2024  7. Dyslipidemia  -     TSH + Free T4; Future; Expected date: 07/19/2024  -     Lipid Panel with Direct LDL reflex; Future; Expected date: 07/19/2024  8. Vitamin D insufficiency  -     Vitamin D 25 hydroxy; Future; Expected date: 07/19/2024  9. Asymptomatic uncomplicated menopause  -     DXA bone density spine hip and pelvis; Future; Expected date: 07/19/2024  10. Tobacco use  -     nicotine (NICODERM CQ) 14 mg/24hr TD 24 hr patch; Place 1 patch on the skin over 24 hours every 24 hours  11. Fibromyalgia, primary  12. Arthritis  13. Restless leg syndrome  14. Chronic pain syndrome  15. History of total replacement of both hip joints  16. Moderate episode of recurrent major depressive disorder (HCC)    FOLLOW-UP PLANS   Return in about 4 months (around 11/19/2024) for after completion of labs.    Current Medication List:     Current Outpatient Medications:   •  nicotine (NICODERM CQ) 14 mg/24hr TD 24 hr patch, Place 1 patch on the skin over 24 hours every 24 hours, Disp: 28 patch, Rfl: 6  •  ALPRAZolam (XANAX) 1 mg tablet, TAKE 1 TABLET BY MOUTH THREE TIMES DAILY AND AN ADDITIONAL HALF TABLET IF NEEDED DAILY, Disp: , Rfl:   •  Cholecalciferol (Vitamin D3) 125 MCG (5000 UT) CAPS, Take 1 capsule (5,000 Units total) by  "mouth daily with lunch, Disp: 90 capsule, Rfl: 3  •  Dextromethorphan-buPROPion ER (Auvelity)  MG TBCR, Take by mouth Patient taking 45 mg once daily., Disp: , Rfl:   •  levothyroxine 75 mcg tablet, TAKE 1 TABLET BY MOUTH EVERY DAY, Disp: 90 tablet, Rfl: 0  •  lisinopril (ZESTRIL) 10 mg tablet, TAKE 1 TABLET DAILY, Disp: 90 tablet, Rfl: 2  •  magnesium oxide (MAG-OX) 400 mg tablet, TAKE 1 TABLET (400 MG TOTAL) BY MOUTH DAILY AT BEDTIME, Disp: 90 tablet, Rfl: 0  Subjective   History of Present Illness       Gina Yo is here for an office visit.   Gina Yo is currently living with daughter who just moved back in who is working from home.   .   Reported education/occupation: retired from [employer] department, works for [employer] now.           Review of Systems    Objective:     /60 (BP Location: Left arm, Patient Position: Sitting, Cuff Size: Standard)   Pulse 68   Temp (!) 96.8 °F (36 °C) (Tympanic)   Resp 14   Ht 5' 5\" (1.651 m)   Wt 69.9 kg (154 lb)   LMP  (LMP Unknown)   SpO2 98%   BMI 25.63 kg/m²      Physical Exam  Vitals reviewed.   Constitutional:       General: She is not in acute distress.     Appearance: Normal appearance. She is not ill-appearing, toxic-appearing or diaphoretic.   HENT:      Head: Normocephalic and atraumatic.      Right Ear: External ear normal.      Left Ear: External ear normal.      Nose: No congestion or rhinorrhea.   Eyes:      General: No scleral icterus.        Right eye: No discharge.         Left eye: No discharge.      Conjunctiva/sclera: Conjunctivae normal.   Cardiovascular:      Rate and Rhythm: Normal rate.   Pulmonary:      Effort: Pulmonary effort is normal. No respiratory distress.   Neurological:      General: No focal deficit present.      Mental Status: She is alert and oriented to person, place, and time.   Psychiatric:         Mood and Affect: Mood normal.         Behavior: Behavior normal.         Thought Content: Thought content " normal.           Ramirez Oh MD  Family Medicine Physician   [facility] PRIMARY CARE [place]        Administrative Statements

## 2024-07-22 ENCOUNTER — TELEPHONE (OUTPATIENT)
Dept: ADMINISTRATIVE | Facility: OTHER | Age: 59
End: 2024-07-22

## 2024-07-22 NOTE — TELEPHONE ENCOUNTER
Upon review of the In Basket request we were able to note that no further action is required. The patient chart is up to date as a result of a previous request.      Any additional questions or concerns should be emailed to the Practice Liaisons via the appropriate education email address, please do not reply via In Basket.    Thank you  Ines Mackey [facility]

## 2024-07-22 NOTE — TELEPHONE ENCOUNTER
----- Message from Ramirez Oh MD sent at 7/19/2024  2:42 PM EDT -----  Regarding: care gap request  07/19/24 2:42 PM    Hello, our patient attached above has had Mammogram completed/performed. Please assist in updating the patient chart by pulling the Care Everywhere (CE) document.     Thank you,  Ramirez hO MD   PRIMARY CARE [place]

## 2024-08-31 DIAGNOSIS — G25.81 RESTLESS LEG SYNDROME: ICD-10-CM

## 2024-09-03 RX ORDER — MAGNESIUM OXIDE 400 MG/1
400 TABLET ORAL
Qty: 90 TABLET | Refills: 0 | Status: SHIPPED | OUTPATIENT
Start: 2024-09-03 | End: 2024-12-02

## 2024-09-10 DIAGNOSIS — I10 ESSENTIAL HYPERTENSION: ICD-10-CM

## 2024-09-10 RX ORDER — LEVOTHYROXINE SODIUM 75 UG/1
75 TABLET ORAL DAILY
Qty: 90 TABLET | Refills: 0 | Status: SHIPPED | OUTPATIENT
Start: 2024-09-10

## 2024-10-06 DIAGNOSIS — K90.9 MALABSORPTION SYNDROME: ICD-10-CM

## 2024-10-06 DIAGNOSIS — D50.8 IRON DEFICIENCY ANEMIA SECONDARY TO INADEQUATE DIETARY IRON INTAKE: ICD-10-CM

## 2024-10-06 DIAGNOSIS — E55.9 VITAMIN D DEFICIENCY: ICD-10-CM

## 2024-10-07 ENCOUNTER — PATIENT MESSAGE (OUTPATIENT)
Age: 59
End: 2024-10-07

## 2024-10-07 DIAGNOSIS — B00.1 COLD SORE: Primary | ICD-10-CM

## 2024-10-08 RX ORDER — VALACYCLOVIR HYDROCHLORIDE 500 MG/1
500 TABLET, FILM COATED ORAL 2 TIMES DAILY
Qty: 6 TABLET | Refills: 6 | Status: SHIPPED | OUTPATIENT
Start: 2024-10-08 | End: 2024-10-09 | Stop reason: SDUPTHER

## 2024-10-25 LAB
25(OH)D3 SERPL-MCNC: 69 NG/ML (ref 30–100)
CHOLEST SERPL-MCNC: 166 MG/DL
CHOLEST/HDLC SERPL: 2.5 (CALC)
HBA1C MFR BLD: 5.3 % OF TOTAL HGB
HDLC SERPL-MCNC: 67 MG/DL
LDLC SERPL CALC-MCNC: 86 MG/DL (CALC)
NONHDLC SERPL-MCNC: 99 MG/DL (CALC)
T4 FREE SERPL-MCNC: 1.1 NG/DL (ref 0.8–1.8)
TRIGL SERPL-MCNC: 53 MG/DL
TSH SERPL-ACNC: 2.04 MIU/L (ref 0.4–4.5)

## 2024-11-07 ENCOUNTER — OFFICE VISIT (OUTPATIENT)
Age: 59
End: 2024-11-07
Payer: COMMERCIAL

## 2024-11-07 VITALS
OXYGEN SATURATION: 94 % | HEIGHT: 65 IN | DIASTOLIC BLOOD PRESSURE: 60 MMHG | HEART RATE: 62 BPM | TEMPERATURE: 97.6 F | RESPIRATION RATE: 16 BRPM | WEIGHT: 158 LBS | BODY MASS INDEX: 26.33 KG/M2 | SYSTOLIC BLOOD PRESSURE: 98 MMHG

## 2024-11-07 DIAGNOSIS — E03.9 ACQUIRED HYPOTHYROIDISM: ICD-10-CM

## 2024-11-07 DIAGNOSIS — J06.9 VIRAL URI WITH COUGH: ICD-10-CM

## 2024-11-07 DIAGNOSIS — G89.4 CHRONIC PAIN SYNDROME: ICD-10-CM

## 2024-11-07 DIAGNOSIS — Z96.643 HISTORY OF TOTAL REPLACEMENT OF BOTH HIP JOINTS: ICD-10-CM

## 2024-11-07 DIAGNOSIS — I10 PRIMARY HYPERTENSION: Primary | ICD-10-CM

## 2024-11-07 DIAGNOSIS — Z12.31 ENCOUNTER FOR SCREENING MAMMOGRAM FOR BREAST CANCER: ICD-10-CM

## 2024-11-07 DIAGNOSIS — M54.50 LUMBAR PAIN: ICD-10-CM

## 2024-11-07 DIAGNOSIS — B00.1 COLD SORE: ICD-10-CM

## 2024-11-07 DIAGNOSIS — E78.5 DYSLIPIDEMIA: ICD-10-CM

## 2024-11-07 DIAGNOSIS — Z12.4 SCREENING FOR CERVICAL CANCER: ICD-10-CM

## 2024-11-07 DIAGNOSIS — M15.0 PRIMARY OSTEOARTHRITIS INVOLVING MULTIPLE JOINTS: ICD-10-CM

## 2024-11-07 DIAGNOSIS — M13.88: ICD-10-CM

## 2024-11-07 PROCEDURE — 99215 OFFICE O/P EST HI 40 MIN: CPT | Performed by: FAMILY MEDICINE

## 2024-11-07 RX ORDER — BENZONATATE 100 MG/1
100 CAPSULE ORAL 3 TIMES DAILY PRN
Qty: 20 CAPSULE | Refills: 0 | Status: SHIPPED | OUTPATIENT
Start: 2024-11-07 | End: 2024-11-07

## 2024-11-07 RX ORDER — AMOXICILLIN 500 MG/1
500 CAPSULE ORAL EVERY 12 HOURS SCHEDULED
Qty: 14 CAPSULE | Refills: 0 | Status: SHIPPED | OUTPATIENT
Start: 2024-11-09 | End: 2024-11-16

## 2024-11-07 RX ORDER — VALACYCLOVIR HYDROCHLORIDE 500 MG/1
500 TABLET, FILM COATED ORAL 2 TIMES DAILY
Qty: 6 TABLET | Refills: 6 | Status: SHIPPED | OUTPATIENT
Start: 2024-11-07 | End: 2024-11-10

## 2024-11-07 RX ORDER — IPRATROPIUM BROMIDE 21 UG/1
2 SPRAY, METERED NASAL EVERY 12 HOURS
Qty: 30 ML | Refills: 0 | Status: SHIPPED | OUTPATIENT
Start: 2024-11-07 | End: 2024-11-07

## 2024-11-07 NOTE — ASSESSMENT & PLAN NOTE
BP Readings from Last 3 Encounters:   11/07/24 98/60   07/19/24 116/60   11/10/23 126/82     Currently prescribed lisinopril 10 mg daily  Today pt reports: Taking as prescribed.  Assessment: Controlled  Med changes: None. Continue current regimen.   Lifestyle modifications recommended, including reduced sodium intake, regular exercise, maintaining a healthy weight, limiting alcohol consumption, and/or avoiding cig smoking.

## 2024-11-07 NOTE — PROGRESS NOTES
[facility]  Ambulatory Office Visit     PATIENT INFORMATION   Name: Gina Yo   YOB: 1965   MRN: [id number]  Encounter Provider: Ramirez Oh MD    Encounter Date: 11/7/2024    ASSESSMENT & PLAN     Assessment & Plan  Primary hypertension  BP Readings from Last 3 Encounters:   11/07/24 98/60   07/19/24 116/60   11/10/23 126/82     Currently prescribed lisinopril 10 mg daily  Today pt reports: Taking as prescribed.  Assessment: Controlled  Med changes: None. Continue current regimen.   Lifestyle modifications recommended, including reduced sodium intake, regular exercise, maintaining a healthy weight, limiting alcohol consumption, and/or avoiding cig smoking.        Viral URI with cough  URI sxs for the past 7 days, initially improving then yesterday symptoms got worse.  Delayed abx ordered if no improvement may start abx.   Provided reassurance about self-limiting nature, should clear up within 7-10 days.   Discussed importance of rest, hydration and abx not needed at this time.   OTC Tylenol, NyQuil, DayQuil discussed for sx management  Discussed importance of diaphragm breathing and avoiding shallow breathing to decrease risk of pneumonia  Return parameters discussed        Acquired hypothyroidism  Lab Results   Component Value Date    [id number] 1.590 02/16/2022    TSH 2.04 10/24/2024      Currently prescribed levothyroxine 75 mcg early morning  Today pt reports: Taking as prescribed.  Assessment: controlled  Med changes: None. Continue current regimen.   Follow up with thyroid blood work every 3-6 months, sooner if noted symptom changes.         Dyslipidemia  Reviewed & discussed labs including lipid panel   10/24/2024 Triglycerides/HDL ratio: 0.79. Goal <2.  Recent results showed 0.79  Apolipoprotein B: -, Goal <60  Lipoprotein a: -  Currently not prescribed any meds.   Assessment: HDL 67, triglyceride 53, LDL 86.  LDL improving from previous 115.  Med changes: None.  Continue to focus on lifestyle improvement only.    Advised improving nutritional intake and exercising regularly, really focusing on building good habits as discussed.  Goal of <60 Apolipoprotein B & LDL, <100 triglycerides, and >60 HDL       Screening for cervical cancer         Encounter for screening mammogram for breast cancer    Orders:    Mammo screening bilateral w 3d and cad; Future    Chronic pain syndrome         Lumbar pain    Orders:    Ambulatory Referral to Physical Therapy; Future    Ambulatory Referral to Orthopedic Surgery; Future    History of total replacement of both hip joints    Orders:    Ambulatory Referral to Orthopedic Surgery; Future    Seronegative arthritis of joint of spine         Primary osteoarthritis involving multiple joints    Orders:    Ambulatory Referral to Physical Therapy; Future    Ambulatory Referral to Orthopedic Surgery; Future    Cold sore    Orders:    valACYclovir (VALTREX) 500 mg tablet; Take 1 tablet (500 mg total) by mouth 2 (two) times a day for 3 days . Start ASAP within 24 hours of symptom onset, maximum efficacy within 48 hours.         HEALTH MAINTENANCE     Immunization History   Administered Date(s) Administered    COVID-19 PFIZER VACCINE 0.3 ML IM 03/29/2021, 04/22/2021, 11/01/2021    INFLUENZA 12/26/2019, 09/27/2021, 09/22/2022, 09/01/2023    Influenza Injectable, MDCK, Preservative Free, 0.5 mL 09/19/2024    Influenza Injectable, MDCK, Preservative Free, Quadrivalent, 0.5 mL 09/23/2020    Influenza, injectable, quadrivalent, preservative free 0.5 mL 12/26/2019    Influenza, recombinant, quadrivalent,injectable, preservative free 09/19/2018    Pneumococcal Conjugate Vaccine 20-valent (Pcv20), Polysace 09/01/2023    Tdap 09/19/2024    Zoster Vaccine Recombinant 09/13/2021, 12/07/2021     Pap smear:Not on file  Mammogram:12/07/2023   Colonoscopy:09/14/2023 09/14/2023  Cologuard:Not on file   DEXA scan:Not on file      FOLLOW UP   Return in about 6 months  (around 5/7/2025) for routine follow up.    CURRENT MEDICATIONS     Current Outpatient Medications:     ALPRAZolam (XANAX) 1 mg tablet, TAKE 1 TABLET BY MOUTH THREE TIMES DAILY AND AN ADDITIONAL HALF TABLET IF NEEDED DAILY, Disp: , Rfl:     Cholecalciferol (Vitamin D3) 125 MCG (5000 UT) CAPS, TAKE 1 CAPSULE (5,000 UNITS TOTAL) BY MOUTH DAILY WITH LUNCH, Disp: 90 capsule, Rfl: 3    Dextromethorphan-buPROPion ER (Auvelity)  MG TBCR, Take by mouth Patient taking 45 mg once daily., Disp: , Rfl:     levothyroxine 75 mcg tablet, TAKE 1 TABLET BY MOUTH EVERY DAY, Disp: 90 tablet, Rfl: 0    lisinopril (ZESTRIL) 10 mg tablet, TAKE 1 TABLET DAILY, Disp: 90 tablet, Rfl: 2    magnesium oxide (MAG-OX) 400 mg tablet, TAKE 1 TABLET (400 MG TOTAL) BY MOUTH DAILY AT BEDTIME, Disp: 90 tablet, Rfl: 0    nicotine (NICODERM CQ) 14 mg/24hr TD 24 hr patch, Place 1 patch on the skin over 24 hours every 24 hours, Disp: 28 patch, Rfl: 6    valACYclovir (VALTREX) 500 mg tablet, Take 1 tablet (500 mg total) by mouth 2 (two) times a day for 3 days . Start ASAP within 24 hours of symptom onset, maximum efficacy within 48 hours., Disp: 6 tablet, Rfl: 6      CHIEF COMPLIANT     Chief Complaint   Patient presents with    Follow-up    Cold Like Symptoms    Cough    Sore Throat    chest congestion    Nasal Congestion        HISTORY OF PRESENT ILLNESS    History of Present Illness     History obtained from : patient  HPI  Review of Systems   Constitutional:  Negative for chills and fever.   HENT:  Positive for congestion, postnasal drip, rhinorrhea and sore throat (scratchy).    Respiratory:  Negative for cough, chest tightness, shortness of breath and wheezing.    Cardiovascular:  Negative for chest pain.   Gastrointestinal:  Negative for abdominal pain, nausea and vomiting.       PAST MEDICAL & SURGICAL HISTORY     Past Medical History:   Diagnosis Date    Anxiety     Arthritis     Colon polyp     Depression     Fibromyalgia, primary     H/O  " section     History of gastric surgery     for morbid obesity    History of total hip replacement     Hypertension     Hypothyroidism     Portal vein thrombosis      Past Surgical History:   Procedure Laterality Date     SECTION      two    CHOLECYSTECTOMY      COLONOSCOPY      GASTRIC BYPASS      HIP SURGERY Bilateral     total hip replacement    JOINT REPLACEMENT Bilateral     hips    IN ESOPHAGOGASTRODUODENOSCOPY TRANSORAL DIAGNOSTIC N/A 2017    Procedure: EGD AND COLONOSCOPY;  Surgeon: Simeon Dunn III, MD;  Location: [facility];  Service: Gastroenterology    IN LAPS SURG CHOLECYSTECTOMY W/CHOLANGIOGRAPHY N/A 2017    Procedure: LAPAROSCOPIC CHOLECYSTECTOMY WITH IOC ;  Surgeon: Sekou Duenas MD;  Location: [facility];  Service: General    WISDOM TOOTH EXTRACTION         OBJECTIVES      BP 98/60 (BP Location: Left arm, Patient Position: Sitting, Cuff Size: Standard)   Pulse 62   Temp 97.6 °F (36.4 °C) (Tympanic)   Resp 16   Ht 5' 5\" (1.651 m)   Wt 71.7 kg (158 lb)   LMP  (LMP Unknown)   SpO2 94%   BMI 26.29 kg/m²    Physical Exam  Vitals reviewed.   Constitutional:       General: She is not in acute distress.     Appearance: Normal appearance. She is not ill-appearing, toxic-appearing or diaphoretic.   HENT:      Head: Normocephalic and atraumatic.      Right Ear: External ear normal.      Left Ear: External ear normal.      Nose: Nose normal. No congestion or rhinorrhea.      Mouth/Throat:      Mouth: Mucous membranes are moist.   Eyes:      General: No scleral icterus.        Right eye: No discharge.         Left eye: No discharge.      Extraocular Movements: Extraocular movements intact.      Conjunctiva/sclera: Conjunctivae normal.   Cardiovascular:      Rate and Rhythm: Normal rate and regular rhythm.      Pulses: Normal pulses.      Heart sounds: Normal heart sounds.   Pulmonary:      Effort: Pulmonary effort is normal. No respiratory distress.      Breath sounds: " Normal breath sounds.   Abdominal:      Palpations: Abdomen is soft.      Tenderness: There is no abdominal tenderness.   Musculoskeletal:         General: No swelling. Normal range of motion.      Cervical back: Normal range of motion.   Skin:     General: Skin is warm and dry.   Neurological:      General: No focal deficit present.      Mental Status: She is alert and oriented to person, place, and time.   Psychiatric:         Mood and Affect: Mood normal.         Behavior: Behavior normal.         Thought Content: Thought content normal.           Ramirez Oh MD  Family Medicine Physician   [facility] have spent a total time of >40 minutes on 11/07/24 in caring for this patient including Diagnostic results, Prognosis, Risks and benefits of tx options, Instructions for management, Patient and family education, Importance of tx compliance, Risk factor reductions, Impressions, Counseling / Coordination of care, Documenting in the medical record, Reviewing / ordering tests, medicine, procedures  , and Obtaining or reviewing history  .

## 2024-11-07 NOTE — ASSESSMENT & PLAN NOTE
Lab Results   Component Value Date    [id number] 1.590 02/16/2022    TSH 2.04 10/24/2024      Currently prescribed levothyroxine 75 mcg early morning  Today pt reports: Taking as prescribed.  Assessment: controlled  Med changes: None. Continue current regimen.   Follow up with thyroid blood work every 3-6 months, sooner if noted symptom changes.

## 2024-11-07 NOTE — ASSESSMENT & PLAN NOTE
Orders:    Ambulatory Referral to Physical Therapy; Future    Ambulatory Referral to Orthopedic Surgery; Future

## 2024-11-07 NOTE — ASSESSMENT & PLAN NOTE
Reviewed & discussed labs including lipid panel   10/24/2024 Triglycerides/HDL ratio: 0.79. Goal <2.  Recent results showed 0.79  Apolipoprotein B: -, Goal <60  Lipoprotein a: -  Currently not prescribed any meds.   Assessment: HDL 67, triglyceride 53, LDL 86.  LDL improving from previous 115.  Med changes: None. Continue to focus on lifestyle improvement only.    Advised improving nutritional intake and exercising regularly, really focusing on building good habits as discussed.  Goal of <60 Apolipoprotein B & LDL, <100 triglycerides, and >60 HDL

## 2024-11-07 NOTE — PATIENT INSTRUCTIONS
Recommendations for bone and heart health  Take vitamin D3 5,000 units-vitamin K2 100-200 mcg combination pill daily with food to maintain high-normal levels of vitamin D (You can get them from vitamin shops or on Amazon).  Vitamin b6 50 mg at night with magnesium glycinate 500 mg at night.     Why is Vitamin D important?  Adequate vitamin D levels reduces the risk of fractures and osteoporosis.  Vitamin D is involved in modulating the immune system, enhancing the body's defense against infections and supporting overall immune function.  Adequate vitamin D levels are associated with better muscle strength and function.  Deficiency may contribute to muscle weakness and an increase risk of falls in older adults.  Multiple research studies have associated low vitamin D with increased risk of autoimmune disease, cancers including breast cancer, and multiple sclerosis.  Some research also link vitamin D deficiency to increase risk of cardiovascular disease. Vitamin D also plays a role in synthesis of various hormones, including insulin.  It may play a role in insulin sensitivity and glucose metabolism. Adequate levels of vitamin D may also plays a role in supporting mental wellbeing.  In conclusion, lets bring your vitamin D levels up to >65 to prevent many diseases and conditions!     Why is vitamin K2 important?  Vitamin K2 helps ensure that the calcium we obtain through diet/supplements is directed to the bones for proper mineralization, contributing to bone strength and density.  Vitamin K2 also helps to activate proteins that prevent calcium from depositing in the arterial walls and reducing the risk of arterial calcification. Arterial calcification leads to heart and vascular disease. Especially since we are optimizing your vitamin D levels, one of the functions for vitamin D is to absorb calcium from the gastrointestinal track. This will increase calcium levels in the body. Supplementing vitamin K2 will ensure in  removing the calcium from the blood vessels. This will reduce calcification of the vessels, reducing risk of heart and vascular disease.  Let's get your calcium where it belongs, into the bones and out of the vessels with vitamin K2

## 2024-11-29 ENCOUNTER — OFFICE VISIT (OUTPATIENT)
Dept: OBGYN CLINIC | Facility: CLINIC | Age: 59
End: 2024-11-29
Payer: COMMERCIAL

## 2024-11-29 ENCOUNTER — APPOINTMENT (OUTPATIENT)
Dept: RADIOLOGY | Facility: CLINIC | Age: 59
End: 2024-11-29
Payer: COMMERCIAL

## 2024-11-29 VITALS
OXYGEN SATURATION: 98 % | DIASTOLIC BLOOD PRESSURE: 75 MMHG | WEIGHT: 158 LBS | TEMPERATURE: 97.5 F | SYSTOLIC BLOOD PRESSURE: 126 MMHG | RESPIRATION RATE: 18 BRPM | BODY MASS INDEX: 26.33 KG/M2 | HEIGHT: 65 IN | HEART RATE: 50 BPM

## 2024-11-29 DIAGNOSIS — M25.511 RIGHT SHOULDER PAIN, UNSPECIFIED CHRONICITY: ICD-10-CM

## 2024-11-29 DIAGNOSIS — M75.51 SUBACROMIAL BURSITIS OF RIGHT SHOULDER JOINT: Primary | ICD-10-CM

## 2024-11-29 PROCEDURE — 99204 OFFICE O/P NEW MOD 45 MIN: CPT | Performed by: FAMILY MEDICINE

## 2024-11-29 PROCEDURE — 73030 X-RAY EXAM OF SHOULDER: CPT

## 2024-11-29 PROCEDURE — 20610 DRAIN/INJ JOINT/BURSA W/O US: CPT | Performed by: FAMILY MEDICINE

## 2024-11-29 RX ORDER — LIDOCAINE HYDROCHLORIDE 10 MG/ML
4 INJECTION, SOLUTION INFILTRATION; PERINEURAL
Status: COMPLETED | OUTPATIENT
Start: 2024-11-29 | End: 2024-11-29

## 2024-11-29 RX ORDER — TRIAMCINOLONE ACETONIDE 40 MG/ML
40 INJECTION, SUSPENSION INTRA-ARTICULAR; INTRAMUSCULAR
Status: COMPLETED | OUTPATIENT
Start: 2024-11-29 | End: 2024-11-29

## 2024-11-29 RX ADMIN — LIDOCAINE HYDROCHLORIDE 4 ML: 10 INJECTION, SOLUTION INFILTRATION; PERINEURAL at 14:00

## 2024-11-29 RX ADMIN — TRIAMCINOLONE ACETONIDE 40 MG: 40 INJECTION, SUSPENSION INTRA-ARTICULAR; INTRAMUSCULAR at 14:00

## 2024-11-29 NOTE — PROGRESS NOTES
"     Subjective:  Chief Complaint   Patient presents with    Right Shoulder - Clicking, Pain       Gina Yo is a 58 y.o. female complains of right shoulder pain. Onset of the symptoms was several months ago.  Mechanism of injury:  none reproted . Aggravating factors:  laying on affeted side and activity with the soulder . Treatment to date: avoidance of offending activity. Symptoms have progressed to a point and plateaued.    The following portions of the patient's history were reviewed and updated as appropriate:   Medical history  Family history  Medication   PSH  Allergies      Occupation:         Objective:  /75   Pulse (!) 50   Temp 97.5 °F (36.4 °C) (Temporal)   Resp 18   Ht 5' 5\" (1.651 m)   Wt 71.7 kg (158 lb)   LMP  (LMP Unknown)   SpO2 98%   BMI 26.29 kg/m²     Skin: no rashes, lesions, skin discolorations, lacerations  Vasculature: normal radial and ulnar pulse,  normal skin color, normal capillary refill in extremity, no upper extremity edema  Neurologic: Neurologic exam is normal throughout upper extremities, Awake, alert, and oriented x3, no apparent distress.   Musculoskeletal:   right SHOULDER EXAM    General: no gross deformity, no skin changes redness or warmth noted, no sign of infection    ROM:   FF (180): 180  ER (90): 90  IR : t spine      Grind test: negative    Supraspinatus strength testin/5  Infraspinatus strength testin/5  Subscapularis    Belly press: negative      Empty can: positive  [place]: +  [place]ers +  Speed +  Biceps TTP: positive      [place]'s test:negative  Scapular posturing: good    Tenderness to palpation of AC joint: negative  Pain with cross-body adduction: negative      Imaging:    No results found.     Assessment/Plan:  1. Subacromial bursitis of right shoulder joint (Primary)    > 45 min devoted to review of previous, pertinent medical records, imaging, discussion of treatment options, counseling and documentation  Imaging independently reviewed " and discussed with patient.  AC joint Degenerative changes noted, no acute fractures appreciated.  Follow-up official reading.  We discussed the nature of Subacromila bursitis at length and detailed the treatment approach.  Directed to ice the area daily for 20 minutes at a time using a barrier to protect the skin- stressed specifically icing after activity to address inflammation  Start Naproxen 250  mg PO BID for 10 days with food, then to be used as needed moving forward. They were instructed to discontinue this medication is they experienced any upset stomach.  Recommending formal PT- declined  We discussed a HEP and literature was provided for reference. It was explained that this does not supplement formal PT but should serve to bridge the gap, while they wait to get into PT. Advised to avoid any exercises which exacerbate their pain.  Csi perofrmed at today visit  Follow up in 2 weeks if no improvement. Should sx's worsen or any concerns arise, they were advised to follow up sooner or seek more immediate medical attention.  All of the patient's concerns were addressed and questions answered. They verbalized agreement with and understanding of the treatment plan.    Large joint arthrocentesis: R subacromial bursa  Universal Protocol:  Consent: Verbal consent obtained.  Risks and benefits: risks, benefits and alternatives were discussed  Consent given by: patient  Patient identity confirmed: verbally with patient  Supporting Documentation  Indications: pain   Procedure Details  Location: shoulder - R subacromial bursa  Preparation: Patient was prepped and draped in the usual sterile fashion  Needle size: 22 G  Ultrasound guidance: no  Approach: posterior  Medications administered: 4 mL lidocaine 1 %; 40 mg triamcinolone acetonide 40 mg/mL    Patient tolerance: patient tolerated the procedure well with no immediate complications    Risks and benefits of CSI were discussed with patient extensively. Risks were  highlighted which included but were not limited to infection, pain, local site swelling, and chance that injection may not be effective. Patient was also counseled regarding glucose elevation days after receiving CSI and to be mindful of diet and check sugars daily. Patient agreeable to proceed with CSI after counseling.               - XR shoulder 2+ vw right; Future

## 2024-12-12 ENCOUNTER — APPOINTMENT (OUTPATIENT)
Dept: RADIOLOGY | Facility: CLINIC | Age: 59
End: 2024-12-12
Payer: COMMERCIAL

## 2024-12-12 ENCOUNTER — OFFICE VISIT (OUTPATIENT)
Dept: OBGYN CLINIC | Facility: CLINIC | Age: 59
End: 2024-12-12
Payer: COMMERCIAL

## 2024-12-12 VITALS
TEMPERATURE: 97.8 F | HEART RATE: 58 BPM | OXYGEN SATURATION: 98 % | SYSTOLIC BLOOD PRESSURE: 106 MMHG | RESPIRATION RATE: 18 BRPM | DIASTOLIC BLOOD PRESSURE: 69 MMHG

## 2024-12-12 DIAGNOSIS — M65.311 TRIGGER THUMB OF RIGHT HAND: ICD-10-CM

## 2024-12-12 DIAGNOSIS — M79.642 PAIN IN BOTH HANDS: ICD-10-CM

## 2024-12-12 DIAGNOSIS — M79.641 PAIN IN BOTH HANDS: ICD-10-CM

## 2024-12-12 DIAGNOSIS — R20.2 PARESTHESIA OF HAND, BILATERAL: Primary | ICD-10-CM

## 2024-12-12 PROCEDURE — 73130 X-RAY EXAM OF HAND: CPT

## 2024-12-12 PROCEDURE — 99214 OFFICE O/P EST MOD 30 MIN: CPT | Performed by: FAMILY MEDICINE

## 2024-12-12 NOTE — PROGRESS NOTES
Chief Complaint   Patient presents with    Left Hand - Numbness, Tingling, Pain    Right Hand - Numbness, Tingling, Pain        Subjective:  Gina Yo is a 59 y.o. female presenting today for initial evaluation of bilateral hand and wrist paresthesias.  Right greater than left.  Patient states symptoms have been ongoing for the past several months without any inciting injury.  She was seen by orthopedic physician in the past and received a injection for trigger thumb of her right hand however feels that this symptoms are different.  She experiences numbness and tingling in the digits of the hand while sleeping at night and occasional spasms.  Pain is predominantly experienced along the palmar aspect of bilateral hands.    The following portions of the patient's history were reviewed and updated as appropriate: allergies, current medications, past family history, past medical history, past social history, past surgical history and problem list.    Occupation:      Review of Systems   Constitutional: Negative for fever.   Musculoskeletal: Positive for and pain  Neuro: Positive for numbness or tingling in arm       Objective:  /69   Pulse 58   Temp 97.8 °F (36.6 °C) (Temporal)   Resp 18   LMP  (LMP Unknown)   SpO2 98%     Skin: no rashes, lesions, skin discolorations, lacerations  Vasculature: normal radial and ulnar pulse, normal skin color, normal capillary refill in extremity, no upper extremity edema  Neurologic: Neurologic exam is normal throughout upper extremities, Awake, alert, and oriented x3, no apparent distress.   Musculoskeletal:   Bilateral hand and wrist   No gross deformity ecchymosis or swelling  Full range of motion with flexion extension pronation and supination  No snuffbox tenderness  There is some tenderness to palpation over the first CMC joint  Palpable nodule at the A1 pulley of the thumb of the right hand however no palpable locking or triggering  Negative Tinel's and  Durkan's  No motor deficits in the ulnar or median nerve distribution    imaging:    XR shoulder 2+ vw right  Result Date: 11/29/2024  Narrative: RIGHT SHOULDER INDICATION:   Pain in right shoulder. COMPARISON:  None. VIEWS:  XR SHOULDER 2+ VW RIGHT FINDINGS: There is no acute fracture or dislocation. Hypertrophic degenerative arthritis right acromioclavicular joint. No lytic or blastic osseous lesion. Soft tissues are unremarkable.     Impression: Hypertrophic degenerative arthritis right acromioclavicular joint. Electronically signed: 11/29/2024 03:14 PM Giovany Chaney MD       Assessment/Plan:    1. Paresthesia of hand, bilateral (Primary)  Radiographs of bilateral hands were obtained and reviewed independently, negative for fracture or dislocation.  Follow-up official radiology report.  Clinical impression is that the patient's bilateral paresthesias symptoms are likely arising from carpal tunnel syndrome.  Recommending that the patient follow-up with an EMG study of bilateral upper extremities  She will begin with cock up wrist braces for nighttime use to treat for suspected carpal tunnel  Discussed follow-up visit after completion of the EMG studies.  Her her radiographs do reveal slight degree of CMC arthritis and she has tenderness to palpation over this area however does not seem to be her predominant pain issue. QAdditionally she has a palpable nodule at the A1 pulley of the thumb of the right hand however no locking or triggering reported.  Will continue to monitor both CMC arthritis and trigger thumb  - XR hand 3+ vw left; Future  - XR hand 3+ vw right; Future  - EMG 2 Limb Upper Extremity; Future  - Cock Up Wrist Splint

## 2024-12-13 DIAGNOSIS — I10 ESSENTIAL HYPERTENSION: ICD-10-CM

## 2024-12-16 RX ORDER — LEVOTHYROXINE SODIUM 75 UG/1
75 TABLET ORAL DAILY
Qty: 90 TABLET | Refills: 1 | Status: SHIPPED | OUTPATIENT
Start: 2024-12-16

## 2025-01-27 DIAGNOSIS — Z00.6 ENCOUNTER FOR EXAMINATION FOR NORMAL COMPARISON OR CONTROL IN CLINICAL RESEARCH PROGRAM: ICD-10-CM

## 2025-01-31 ENCOUNTER — HOSPITAL ENCOUNTER (OUTPATIENT)
Age: 60
Discharge: HOME | End: 2025-01-31
Payer: COMMERCIAL

## 2025-01-31 VITALS — BODY MASS INDEX: 29.06 KG/M2 | HEIGHT: 63 IN | WEIGHT: 164 LBS

## 2025-01-31 DIAGNOSIS — Z78.0 ASYMPTOMATIC UNCOMPLICATED MENOPAUSE: ICD-10-CM

## 2025-01-31 PROCEDURE — 77080 DXA BONE DENSITY AXIAL: CPT

## 2025-02-03 ENCOUNTER — RESULTS FOLLOW-UP (OUTPATIENT)
Age: 60
End: 2025-02-03

## 2025-02-03 DIAGNOSIS — I10 ESSENTIAL HYPERTENSION: ICD-10-CM

## 2025-02-24 ENCOUNTER — PROCEDURE VISIT (OUTPATIENT)
Dept: NEUROLOGY | Facility: CLINIC | Age: 60
End: 2025-02-24
Payer: COMMERCIAL

## 2025-02-24 DIAGNOSIS — R20.2 PARESTHESIA OF HAND, BILATERAL: ICD-10-CM

## 2025-02-24 PROCEDURE — 95912 NRV CNDJ TEST 11-12 STUDIES: CPT | Performed by: PHYSICAL MEDICINE & REHABILITATION

## 2025-02-24 PROCEDURE — 95886 MUSC TEST DONE W/N TEST COMP: CPT | Performed by: PHYSICAL MEDICINE & REHABILITATION

## 2025-03-03 ENCOUNTER — RESULTS FOLLOW-UP (OUTPATIENT)
Dept: OBGYN CLINIC | Facility: CLINIC | Age: 60
End: 2025-03-03

## 2025-03-03 NOTE — RESULT ENCOUNTER NOTE
Call to this patient no answer. I left a message to contact our office @ [phone] regarding the results of her EMG. I will also send a patient message via MY Chart.

## 2025-03-14 ENCOUNTER — OFFICE VISIT (OUTPATIENT)
Dept: OBGYN CLINIC | Facility: CLINIC | Age: 60
End: 2025-03-14
Payer: COMMERCIAL

## 2025-03-14 DIAGNOSIS — M77.8 RIGHT WRIST TENDONITIS: Primary | ICD-10-CM

## 2025-03-14 DIAGNOSIS — M79.7 FIBROMYALGIA, PRIMARY: ICD-10-CM

## 2025-03-14 DIAGNOSIS — G56.01 CARPAL TUNNEL SYNDROME ON RIGHT: ICD-10-CM

## 2025-03-14 DIAGNOSIS — M25.50 POLYARTHRALGIA: ICD-10-CM

## 2025-03-14 PROCEDURE — 99214 OFFICE O/P EST MOD 30 MIN: CPT | Performed by: FAMILY MEDICINE

## 2025-03-14 PROCEDURE — 20526 THER INJECTION CARP TUNNEL: CPT | Performed by: FAMILY MEDICINE

## 2025-03-14 RX ORDER — TRIAMCINOLONE ACETONIDE 40 MG/ML
40 INJECTION, SUSPENSION INTRA-ARTICULAR; INTRAMUSCULAR
Status: COMPLETED | OUTPATIENT
Start: 2025-03-14 | End: 2025-03-14

## 2025-03-14 RX ORDER — LIDOCAINE HYDROCHLORIDE 10 MG/ML
1 INJECTION, SOLUTION INFILTRATION; PERINEURAL
Status: COMPLETED | OUTPATIENT
Start: 2025-03-14 | End: 2025-03-14

## 2025-03-14 RX ADMIN — TRIAMCINOLONE ACETONIDE 40 MG: 40 INJECTION, SUSPENSION INTRA-ARTICULAR; INTRAMUSCULAR at 10:30

## 2025-03-14 RX ADMIN — LIDOCAINE HYDROCHLORIDE 1 ML: 10 INJECTION, SOLUTION INFILTRATION; PERINEURAL at 10:30

## 2025-03-14 NOTE — PROGRESS NOTES
Hand/upper extremity injection  Universal Protocol:  Consent: Verbal consent obtained.  Risks and benefits: risks, benefits and alternatives were discussed  Consent given by: patient  Supporting Documentation  Indications: pain   Procedure Details  Condition:carpal tunnel syndrome Needle size: 25 G  Ultrasound guidance: no  Approach: volar  Medications administered: 1 mL lidocaine 1 %; 40 mg triamcinolone acetonide 40 mg/mL  Patient tolerance: patient tolerated the procedure well with no immediate complications    Risks and benefits of CSI were discussed with patient extensively. Risks were highlighted which included but were not limited to infection, pain, local site swelling, and chance that injection may not be effective. Patient was also counseled regarding glucose elevation days after receiving CSI and to be mindful of diet and check sugars daily. Patient agreeable to proceed with CSI after counseling.

## 2025-03-14 NOTE — PROGRESS NOTES
Chief Complaint   Patient presents with    Right Hand - Follow-up, Numbness, Tingling, Pain     Trigger thumb 2 yrs ago had 1 csi    Left Hand - Numbness, Tingling, Pain        Subjective:  Gina Yo is a 59 y.o. female presenting today for follow-up evaluation for bilateral hand paresthesia and wrist pain.  Patient was referred for EMG study to rule out possible carpal tunnel.  EMG study revealed mild carpal tunnel of the right wrist, no compressive neuropathy of the left extremity noted.  She has been using cock up wrist braces however continues to persist with pain numbness and tingling.  She states the right wrist is worse than the left and predominates the pain in her thumb and radiating throughout the entirety of her wrist joint.    Her medical history is consistent for hypothyroidism chronic pain syndrome and fibromyalgia.    The following portions of the patient's history were reviewed and updated as appropriate: allergies, current medications, past family history, past medical history, past social history, past surgical history and problem list.    Occupation:      Review of Systems   Constitutional: Negative for fever.   Musculoskeletal: Positive for and pain  Neuro: Positive for numbness or tingling in arm       Objective:  LMP  (LMP Unknown)     Skin: no rashes, lesions, skin discolorations, lacerations  Vasculature: normal radial and ulnar pulse, normal skin color, normal capillary refill in extremity, no upper extremity edema  Neurologic: Neurologic exam is normal throughout upper extremities, Awake, alert, and oriented x3, no apparent distress.   Musculoskeletal:   Bilateral hand and wrist   No gross deformity ecchymosis or swelling  Full range of motion with flexion extension pronation and supination  No snuffbox tenderness  No tenderness over the CMC  Negative CMC grind  Negative TFCC grind and negative fovea    No motor deficits in the ulnar or median nerve distribution    imaging:    XR  shoulder 2+ vw right  Result Date: 11/29/2024  Narrative: RIGHT SHOULDER INDICATION:   Pain in right shoulder. COMPARISON:  None. VIEWS:  XR SHOULDER 2+ VW RIGHT FINDINGS: There is no acute fracture or dislocation. Hypertrophic degenerative arthritis right acromioclavicular joint. No lytic or blastic osseous lesion. Soft tissues are unremarkable.     Impression: Hypertrophic degenerative arthritis right acromioclavicular joint. Electronically signed: 11/29/2024 03:14 PM Giovany Chaney MD       Assessment/Plan:    1. Right wrist tendonitis (Primary)    - Ambulatory Referral to Occupational Therapy; Future  - Ambulatory Referral to Rheumatology; Future    2. Fibromyalgia, primary    - Ambulatory Referral to Rheumatology; Future    3. Polyarthralgia      4. Carpal tunnel syndrome on right    - Hand/upper extremity injection      EMG study did reveal findings consistent with mild carpal tunnel on the right wrist however was unremarkable for the left wrist.  She had previous radiographs completed at the last visit which were unremarkable for fracture or dislocation and there were some mild degenerative changes in the IP joint of the right thumb.  It is unclear why the patient is continue to persist with paresthesia symptoms and predominant pain in the bilateral wrist joints.  She does have a medical history consistent for fibromyalgia which may be the reason for her symptoms.  We discussed trialing a cortisone injection for her right carpal tunnel to see if this may help alleviate some of her paresthesias symptoms and she is agreeable.  CSI was performed for right carpal tunnel at today's office visit.  Finally given her symptoms of wrist pain I encouraged patient to follow-up with a rheumatologist to evaluate and manage fibromyalgia and determine if any other possible autoimmune etiology may be attribute in her pain symptoms.  Encouraged patient to begin with occupational therapy for the wrist.  Plan to follow-up again in  8 weeks after completion of PT

## 2025-04-01 RX ORDER — LISINOPRIL 10 MG/1
10 TABLET ORAL DAILY
Qty: 100 TABLET | Refills: 0 | Status: SHIPPED | OUTPATIENT
Start: 2025-04-01

## 2025-04-03 DIAGNOSIS — I10 ESSENTIAL HYPERTENSION: ICD-10-CM

## 2025-04-04 RX ORDER — LISINOPRIL 10 MG/1
10 TABLET ORAL DAILY
Qty: 90 TABLET | Refills: 1 | OUTPATIENT
Start: 2025-04-04

## 2025-04-10 ENCOUNTER — TELEPHONE (OUTPATIENT)
Dept: OBGYN CLINIC | Facility: HOSPITAL | Age: 60
End: 2025-04-10

## 2025-04-10 NOTE — TELEPHONE ENCOUNTER
[facility]    Doctor: Dr. Edgar    Reason for call: Looking for recent OVN from Dr Edgar and XR left and right hand Report. Asking for this to be faxed over.    Fax# [phone]    Call back#: [phone] ext [phone]

## 2025-04-10 NOTE — TELEPHONE ENCOUNTER
OVN from Dr Edgar and XR left and right hand Report Faxed [phone] [facility] as requested.

## 2025-05-08 ENCOUNTER — OFFICE VISIT (OUTPATIENT)
Age: 60
End: 2025-05-08
Payer: COMMERCIAL

## 2025-05-08 ENCOUNTER — APPOINTMENT (OUTPATIENT)
Age: 60
End: 2025-05-08
Payer: COMMERCIAL

## 2025-05-08 VITALS
HEART RATE: 60 BPM | TEMPERATURE: 97.4 F | WEIGHT: 159.6 LBS | OXYGEN SATURATION: 97 % | SYSTOLIC BLOOD PRESSURE: 120 MMHG | HEIGHT: 63 IN | RESPIRATION RATE: 14 BRPM | DIASTOLIC BLOOD PRESSURE: 80 MMHG | BODY MASS INDEX: 28.28 KG/M2

## 2025-05-08 DIAGNOSIS — Z13.220 ENCOUNTER FOR LIPID SCREENING FOR CARDIOVASCULAR DISEASE: ICD-10-CM

## 2025-05-08 DIAGNOSIS — G56.03 BILATERAL CARPAL TUNNEL SYNDROME: ICD-10-CM

## 2025-05-08 DIAGNOSIS — Z00.6 ENCOUNTER FOR EXAMINATION FOR NORMAL COMPARISON OR CONTROL IN CLINICAL RESEARCH PROGRAM: ICD-10-CM

## 2025-05-08 DIAGNOSIS — I10 PRIMARY HYPERTENSION: Chronic | ICD-10-CM

## 2025-05-08 DIAGNOSIS — Z12.4 SCREENING FOR CERVICAL CANCER: ICD-10-CM

## 2025-05-08 DIAGNOSIS — F17.211 NICOTINE DEPENDENCE, CIGARETTES, IN REMISSION: ICD-10-CM

## 2025-05-08 DIAGNOSIS — Z00.00 ANNUAL PHYSICAL EXAM: ICD-10-CM

## 2025-05-08 DIAGNOSIS — Z13.6 ENCOUNTER FOR LIPID SCREENING FOR CARDIOVASCULAR DISEASE: ICD-10-CM

## 2025-05-08 DIAGNOSIS — E55.9 VITAMIN D DEFICIENCY: ICD-10-CM

## 2025-05-08 DIAGNOSIS — R20.2 PARESTHESIA: ICD-10-CM

## 2025-05-08 DIAGNOSIS — E03.9 ACQUIRED HYPOTHYROIDISM: Primary | Chronic | ICD-10-CM

## 2025-05-08 PROBLEM — K64.8 INTERNAL AND EXTERNAL PROLAPSED HEMORRHOIDS: Status: ACTIVE | Noted: 2024-04-11

## 2025-05-08 PROBLEM — L60.3 DYSTROPHIC NAIL: Status: RESOLVED | Noted: 2022-02-16 | Resolved: 2025-05-08

## 2025-05-08 LAB
ALBUMIN SERPL BCG-MCNC: 4.2 G/DL (ref 3.5–5)
ALP SERPL-CCNC: 106 U/L (ref 34–104)
ALT SERPL W P-5'-P-CCNC: 28 U/L (ref 7–52)
ANION GAP SERPL CALCULATED.3IONS-SCNC: 9 MMOL/L (ref 4–13)
AST SERPL W P-5'-P-CCNC: 27 U/L (ref 13–39)
BASOPHILS # BLD AUTO: 0.05 THOUSANDS/ÂΜL (ref 0–0.1)
BASOPHILS NFR BLD AUTO: 1 % (ref 0–1)
BILIRUB SERPL-MCNC: 0.89 MG/DL (ref 0.2–1)
BUN SERPL-MCNC: 18 MG/DL (ref 5–25)
CALCIUM SERPL-MCNC: 9.5 MG/DL (ref 8.4–10.2)
CHLORIDE SERPL-SCNC: 105 MMOL/L (ref 96–108)
CHOLEST SERPL-MCNC: 168 MG/DL (ref ?–200)
CO2 SERPL-SCNC: 28 MMOL/L (ref 21–32)
CREAT SERPL-MCNC: 0.83 MG/DL (ref 0.6–1.3)
EOSINOPHIL # BLD AUTO: 0.07 THOUSAND/ÂΜL (ref 0–0.61)
EOSINOPHIL NFR BLD AUTO: 2 % (ref 0–6)
ERYTHROCYTE [DISTWIDTH] IN BLOOD BY AUTOMATED COUNT: 13.9 % (ref 11.6–15.1)
GFR SERPL CREATININE-BSD FRML MDRD: 77 ML/MIN/1.73SQ M
GLUCOSE P FAST SERPL-MCNC: 88 MG/DL (ref 65–99)
HCT VFR BLD AUTO: 41.3 % (ref 34.8–46.1)
HDLC SERPL-MCNC: 62 MG/DL
HGB BLD-MCNC: 13.4 G/DL (ref 11.5–15.4)
IMM GRANULOCYTES # BLD AUTO: 0.01 THOUSAND/UL (ref 0–0.2)
IMM GRANULOCYTES NFR BLD AUTO: 0 % (ref 0–2)
LDLC SERPL CALC-MCNC: 94 MG/DL (ref 0–100)
LYMPHOCYTES # BLD AUTO: 1.3 THOUSANDS/ÂΜL (ref 0.6–4.47)
LYMPHOCYTES NFR BLD AUTO: 32 % (ref 14–44)
MCH RBC QN AUTO: 29.8 PG (ref 26.8–34.3)
MCHC RBC AUTO-ENTMCNC: 32.4 G/DL (ref 31.4–37.4)
MCV RBC AUTO: 92 FL (ref 82–98)
MONOCYTES # BLD AUTO: 0.37 THOUSAND/ÂΜL (ref 0.17–1.22)
MONOCYTES NFR BLD AUTO: 9 % (ref 4–12)
NEUTROPHILS # BLD AUTO: 2.25 THOUSANDS/ÂΜL (ref 1.85–7.62)
NEUTS SEG NFR BLD AUTO: 56 % (ref 43–75)
NRBC BLD AUTO-RTO: 0 /100 WBCS
PLATELET # BLD AUTO: 217 THOUSANDS/UL (ref 149–390)
PMV BLD AUTO: 10.4 FL (ref 8.9–12.7)
POTASSIUM SERPL-SCNC: 4.4 MMOL/L (ref 3.5–5.3)
PROT SERPL-MCNC: 6.4 G/DL (ref 6.4–8.4)
RBC # BLD AUTO: 4.5 MILLION/UL (ref 3.81–5.12)
SODIUM SERPL-SCNC: 142 MMOL/L (ref 135–147)
TRIGL SERPL-MCNC: 59 MG/DL (ref ?–150)
TSH SERPL DL<=0.05 MIU/L-ACNC: 0.79 UIU/ML (ref 0.45–4.5)
VIT B12 SERPL-MCNC: 415 PG/ML (ref 180–914)
WBC # BLD AUTO: 4.05 THOUSAND/UL (ref 4.31–10.16)

## 2025-05-08 PROCEDURE — 99214 OFFICE O/P EST MOD 30 MIN: CPT | Performed by: FAMILY MEDICINE

## 2025-05-08 PROCEDURE — 83695 ASSAY OF LIPOPROTEIN(A): CPT | Performed by: FAMILY MEDICINE

## 2025-05-08 PROCEDURE — 82607 VITAMIN B-12: CPT

## 2025-05-08 PROCEDURE — 99396 PREV VISIT EST AGE 40-64: CPT | Performed by: FAMILY MEDICINE

## 2025-05-08 PROCEDURE — 80061 LIPID PANEL: CPT | Performed by: FAMILY MEDICINE

## 2025-05-08 PROCEDURE — 36415 COLL VENOUS BLD VENIPUNCTURE: CPT | Performed by: FAMILY MEDICINE

## 2025-05-08 PROCEDURE — 82172 ASSAY OF APOLIPOPROTEIN: CPT | Performed by: FAMILY MEDICINE

## 2025-05-08 PROCEDURE — 84443 ASSAY THYROID STIM HORMONE: CPT | Performed by: FAMILY MEDICINE

## 2025-05-08 PROCEDURE — 80053 COMPREHEN METABOLIC PANEL: CPT | Performed by: FAMILY MEDICINE

## 2025-05-08 PROCEDURE — 85025 COMPLETE CBC W/AUTO DIFF WBC: CPT | Performed by: FAMILY MEDICINE

## 2025-05-08 RX ORDER — ESTRADIOL 0.1 MG/G
CREAM VAGINAL
COMMUNITY

## 2025-05-08 RX ORDER — VALACYCLOVIR HYDROCHLORIDE 1 G/1
1 TABLET, FILM COATED ORAL 2 TIMES DAILY
COMMUNITY
Start: 2025-03-27

## 2025-05-08 NOTE — PATIENT INSTRUCTIONS
"Patient Education     Routine physical for adults   The Basics   Written by the doctors and editors at [facility]   What is a physical? -- A physical is a routine visit, or \"check-up,\" with your doctor. You might also hear it called a \"wellness visit\" or \"preventive visit.\"  During each visit, the doctor will:   Ask about your physical and mental health   Ask about your habits, behaviors, and lifestyle   Do an exam   Give you vaccines if needed   Talk to you about any medicines you take   Give advice about your health   Answer your questions  Getting regular check-ups is an important part of taking care of your health. It can help your doctor find and treat any problems you have. But it's also important for preventing health problems.  A routine physical is different from a \"sick visit.\" A sick visit is when you see a doctor because of a health concern or problem. Since physicals are scheduled ahead of time, you can think about what you want to ask the doctor.  How often should I get a physical? -- It depends on your age and health. In general, for people age 21 years and older:   If you are younger than 50 years, you might be able to get a physical every 3 years.   If you are 50 years or older, your doctor might recommend a physical every year.  If you have an ongoing health condition, like diabetes or high blood pressure, your doctor will probably want to see you more often.  What happens during a physical? -- In general, each visit will include:   Physical exam - The doctor or nurse will check your height, weight, heart rate, and blood pressure. They will also look at your eyes and ears. They will ask about how you are feeling and whether you have any symptoms that bother you.   Medicines - It's a good idea to bring a list of all the medicines you take to each doctor visit. Your doctor will talk to you about your medicines and answer any questions. Tell them if you are having any side effects that bother you. You " "should also tell them if you are having trouble paying for any of your medicines.   Habits and behaviors - This includes:   Your diet   Your exercise habits   Whether you smoke, drink alcohol, or use drugs   Whether you are sexually active   Whether you feel safe at home  Your doctor will talk to you about things you can do to improve your health and lower your risk of health problems. They will also offer help and support. For example, if you want to quit smoking, they can give you advice and might prescribe medicines. If you want to improve your diet or get more physical activity, they can help you with this, too.   Lab tests, if needed - The tests you get will depend on your age and situation. For example, your doctor might want to check your:   Cholesterol   Blood sugar   Iron level   Vaccines - The recommended vaccines will depend on your age, health, and what vaccines you already had. Vaccines are very important because they can prevent certain serious or deadly infections.   Discussion of screening - \"Screening\" means checking for diseases or other health problems before they cause symptoms. Your doctor can recommend screening based on your age, risk, and preferences. This might include tests to check for:   Cancer, such as breast, prostate, cervical, ovarian, colorectal, prostate, lung, or skin cancer   Sexually transmitted infections, such as chlamydia and gonorrhea   Mental health conditions like depression and anxiety  Your doctor will talk to you about the different types of screening tests. They can help you decide which screenings to have. They can also explain what the results might mean.   Answering questions - The physical is a good time to ask the doctor or nurse questions about your health. If needed, they can refer you to other doctors or specialists, too.  Adults older than 65 years often need other care, too. As you get older, your doctor will talk to you about:   How to prevent falling at " home   Hearing or vision tests   Memory testing   How to take your medicines safely   Making sure that you have the help and support you need at home  All topics are updated as new evidence becomes available and our peer review process is complete.  This topic retrieved from [employer] on: May 02, 2024.  Topic 966026 Version 1.0  Release: 32.4.3 - C32.122  © 2024 UpToDate, Inc. and/or its affiliates. All rights reserved.  Consumer Information Use and Disclaimer   Disclaimer: This generalized information is a limited summary of diagnosis, treatment, and/or medication information. It is not meant to be comprehensive and should be used as a tool to help the user understand and/or assess potential diagnostic and treatment options. It does NOT include all information about conditions, treatments, medications, side effects, or risks that may apply to a specific patient. It is not intended to be medical advice or a substitute for the medical advice, diagnosis, or treatment of a health care provider based on the health care provider's examination and assessment of a patient's specific and unique circumstances. Patients must speak with a health care provider for complete information about their health, medical questions, and treatment options, including any risks or benefits regarding use of medications. This information does not endorse any treatments or medications as safe, effective, or approved for treating a specific patient. UpToDate, Inc. and its affiliates disclaim any warranty or liability relating to this information or the use thereof.The use of this information is governed by the Terms of Use, available at https://www.woltersMagic Rock Entertainmentuwer.com/en/know/clinical-effectiveness-terms. 2024© UpToDate, Inc. and its affiliates and/or licensors. All rights reserved.  Copyright   © 2024 UpToDate, Inc. and/or its affiliates. All rights reserved.

## 2025-05-08 NOTE — ASSESSMENT & PLAN NOTE
Referral in place for hand surgeon for steroid injection   Orders:  •  Ambulatory Referral to Orthopedic Surgery; Future

## 2025-05-08 NOTE — LETTER
May 8, 2025     Patient: Gina Yo  YOB: 1965  Date of Visit: 5/8/2025      To Whom it May Concern:    Gina Yo is under my professional care. Gina was seen in my office on 5/8/2025. Gina has episodic hypoglycemia which requires patient to carry small candy to eat to treat the hypoglycemic episode.    If you have any questions or concerns, please don't hesitate to call.         Sincerely,          Ramirez Oh MD

## 2025-05-08 NOTE — ASSESSMENT & PLAN NOTE
BP Readings from Last 3 Encounters:   05/08/25 120/80   12/12/24 106/69   11/29/24 126/75      Currently prescribed the following:  Lisinopril 10 mg daily   Today patient reports  Reports taking as prescribed.  Assessment&Plan: Controlled  Med changes: None. Continue current regimen and working on lifestyle improvement.   Complete blood work as ordered.  Lifestyle modifications advised  Including reduced sodium intake, regular exercise, maintaining a healthy weight, limiting alcohol consumption, and/or avoiding cig smoking.  Return in 6 months for routine visit.     Orders:  •  Comprehensive metabolic panel  •  CBC and differential

## 2025-05-08 NOTE — PROGRESS NOTES
Adult Annual Physical  Name: Gina Yo      : 1965      MRN: [id number]  Encounter Provider: Ramirez Oh MD  Encounter Date: 2025   Encounter department: [facility]    :  Assessment & Plan  Acquired hypothyroidism  Lab Results   Component Value Date    [id number] 1.590 2022    TSH 2.04 10/24/2024      Currently prescribed levothyroxine 75 mcg early morning  Today pt reports: Taking as prescribed.  Assessment: needs updated labs  Med changes: None. Follow up with lab work as discussed.  Will place [employer] message under results section with recommendations - pt preference.    Follow up with thyroid blood work every 3-6 months, sooner if noted symptom changes.    Orders:  •  TSH, 3rd generation with Free T4 reflex      Primary hypertension  BP Readings from Last 3 Encounters:   25 120/80   24 106/69   24 126/75      Currently prescribed the following:  Lisinopril 10 mg daily   Today patient reports  Reports taking as prescribed.  Assessment&Plan: Controlled  Med changes: None. Continue current regimen and working on lifestyle improvement.   Complete blood work as ordered.  Lifestyle modifications advised  Including reduced sodium intake, regular exercise, maintaining a healthy weight, limiting alcohol consumption, and/or avoiding cig smoking.  Return in 6 months for routine visit.     Orders:  •  Comprehensive metabolic panel  •  CBC and differential    Vitamin D deficiency  Taking vitamin d 5k with k2.       Annual physical exam  Healthcare Maintenance  Health maintenance completed today  - Medical history reviewed, including existing medical conditions, medications, and surgeries.   - Labs discussed to evaluate cholesterol, blood sugar, kidney function, liver function, and other important markers of health.  - BMI evaluated and discussed.  - Lifestyle and health counseling completed including diet, exercise habits, smoking status, alcohol  consumption.   - Bone & Heart health reviewed  - Cancer screenings discussed: Mammogram/Pap smear/CT lung/colonoscopy.   - Vaccination status reviewed and pertinent immunizations and booster shots discussed.  - Skin examination: Discussed importance of sunscreen and other preventative measures for skin cancer.  - Mental health and wellbeing evaluated and discussed.  - Family history obtained to identify any of hereditary health risks.  Lab orders in place as discussed  Start/continue preventative measures as discussed/advised  Complete preventative orders in place as recommended.   Refer to screenings problem list   Orders:  •  Comprehensive metabolic panel  •  CBC and differential    Nicotine dependence, cigarettes, in remission    Orders:  •  CT lung screening program; Future    Screening for cervical cancer  Has had it updated by gynecology earlier this year       Encounter for lipid screening for cardiovascular disease    Orders:  •  Lipid Panel with Direct LDL reflex  •  Apolipoprotein B  •  Lipoprotein A (LPA)    Bilateral carpal tunnel syndrome  Referral in place for hand surgeon for steroid injection   Orders:  •  Ambulatory Referral to Orthopedic Surgery; Future    Paresthesia    Orders:  •  Vitamin B12; Future        Preventive Screenings:  - Diabetes Screening: screening up-to-date  - Hepatitis C screening: screening up-to-date   - HIV screening: screening up-to-date   - Cervical cancer screening: orders placed and risks/benefits discussed   - Breast cancer screening: screening up-to-date   - Colon cancer screening: screening up-to-date       Depression Screening and Follow-up Plan: Patient's depression screening was positive with a PHQ-9 score of 9.   Patient assessed for underlying major depression. Brief counseling provided and recommend additional follow-up/re-evaluation next office visit.         History of Present Illness     Adult Annual Physical:  Patient presents for annual physical.     Diet and  "Physical Activity:  - Diet/Nutrition: poor diet, frequent junk food and intermittent fasting.  - Exercise: no formal exercise.    Depression Screening:    - PHQ-9 Score: 9    General Health:  - Sleep: sleeps poorly and 4-6 hours of sleep on average.  - Hearing: normal hearing right ear, normal hearing left ear and normal hearing bilateral ears.  - Vision: most recent eye exam > 1 year ago. Reading glasses  - Dental: regular dental visits, brushes teeth twice daily and does not floss. Floss sticks    /GYN Health:  - Follows with GYN: yes.   - Menopause: postmenopausal.   - Last menstrual cycle: 6/22/2006.   - History of STDs: no  - Contraception: menopause.      Advanced Care Planning:  - Has an advanced directive?: yes    - Has a durable medical POA?: no      Review of Systems      Objective   /80 (BP Location: Left arm, Patient Position: Sitting, Cuff Size: Standard)   Pulse 60   Temp (!) 97.4 °F (36.3 °C) (Tympanic)   Resp 14   Ht 5' 3.25\" (1.607 m)   Wt 72.4 kg (159 lb 9.6 oz)   LMP 06/22/2006   SpO2 97%   BMI 28.05 kg/m²     Physical Exam  Vitals reviewed.   Constitutional:       General: She is not in acute distress.     Appearance: Normal appearance. She is not ill-appearing, toxic-appearing or diaphoretic.   HENT:      Head: Normocephalic and atraumatic.      Right Ear: External ear normal.      Left Ear: External ear normal.      Nose: Nose normal.      Mouth/Throat:      Mouth: Mucous membranes are moist.   Eyes:      General: No scleral icterus.        Right eye: No discharge.         Left eye: No discharge.      Extraocular Movements: Extraocular movements intact.      Conjunctiva/sclera: Conjunctivae normal.   Cardiovascular:      Rate and Rhythm: Normal rate and regular rhythm.      Pulses: Normal pulses.      Heart sounds: Normal heart sounds.   Pulmonary:      Effort: Pulmonary effort is normal. No respiratory distress.      Breath sounds: Normal breath sounds.   Abdominal:      " Palpations: Abdomen is soft.      Tenderness: There is no abdominal tenderness.   Musculoskeletal:         General: No swelling. Normal range of motion.      Cervical back: Normal range of motion.   Skin:     General: Skin is warm and dry.   Neurological:      General: No focal deficit present.      Mental Status: She is alert and oriented to person, place, and time.   Psychiatric:         Mood and Affect: Mood normal.         Behavior: Behavior normal.         Thought Content: Thought content normal.

## 2025-05-08 NOTE — ASSESSMENT & PLAN NOTE
Lab Results   Component Value Date    [id number] 1.590 02/16/2022    TSH 2.04 10/24/2024      Currently prescribed levothyroxine 75 mcg early morning  Today pt reports: Taking as prescribed.  Assessment: needs updated labs  Med changes: None. Follow up with lab work as discussed.  Will place [employer] message under results section with recommendations - pt preference.    Follow up with thyroid blood work every 3-6 months, sooner if noted symptom changes.    Orders:  •  TSH, 3rd generation with Free T4 reflex

## 2025-05-09 ENCOUNTER — RESULTS FOLLOW-UP (OUTPATIENT)
Age: 60
End: 2025-05-09

## 2025-05-09 ENCOUNTER — TELEPHONE (OUTPATIENT)
Dept: ADMINISTRATIVE | Facility: OTHER | Age: 60
End: 2025-05-09

## 2025-05-09 LAB
APO B SERPL-MCNC: 92 MG/DL
LPA SERPL-SCNC: 49.5 NMOL/L

## 2025-05-09 NOTE — TELEPHONE ENCOUNTER
Upon review of the In Basket request we were able to locate, review, and update the patient chart as requested for Pap Smear (HPV) aka Cervical Cancer Screening.    Any additional questions or concerns should be emailed to the Practice Liaisons via the appropriate education email address, please do not reply via In Basket.    Thank you  Kev Kwong MA   [facility]

## 2025-05-09 NOTE — TELEPHONE ENCOUNTER
----- Message from Tiffanie HUITRON sent at 5/8/2025 11:24 AM EDT -----  Regarding: pap  05/08/25 11:25 AM    Hello, our patient Gina Yo has had Pap Smear (HPV) aka Cervical Cancer Screening completed/performed. Please assist in updating the patient chart by pulling the Care Everywhere (CE) document. The date of service is 03/2025.     Thank you,  Tiffanie Mack LPN   [facility] [place]

## 2025-05-19 LAB
APOB+LDLR+PCSK9 GENE MUT ANL BLD/T: NOT DETECTED
BRCA1+BRCA2 DEL+DUP + FULL MUT ANL BLD/T: NOT DETECTED
MLH1+MSH2+MSH6+PMS2 GN DEL+DUP+FUL M: NOT DETECTED

## 2025-06-05 DIAGNOSIS — I10 ESSENTIAL HYPERTENSION: ICD-10-CM

## 2025-06-05 RX ORDER — LEVOTHYROXINE SODIUM 75 UG/1
75 TABLET ORAL DAILY
Qty: 90 TABLET | Refills: 1 | Status: SHIPPED | OUTPATIENT
Start: 2025-06-05

## 2025-06-05 RX ORDER — LISINOPRIL 10 MG/1
10 TABLET ORAL DAILY
Qty: 90 TABLET | Refills: 1 | Status: SHIPPED | OUTPATIENT
Start: 2025-06-05

## 2025-07-10 DIAGNOSIS — E55.9 VITAMIN D DEFICIENCY: ICD-10-CM

## 2025-07-10 DIAGNOSIS — D50.8 IRON DEFICIENCY ANEMIA SECONDARY TO INADEQUATE DIETARY IRON INTAKE: ICD-10-CM

## 2025-07-10 DIAGNOSIS — K90.9 MALABSORPTION SYNDROME: ICD-10-CM

## 2025-07-10 RX ORDER — CHOLECALCIFEROL (VITAMIN D3) 125 MCG
CAPSULE ORAL
Qty: 100 CAPSULE | Refills: 3 | Status: SHIPPED | OUTPATIENT
Start: 2025-07-10

## (undated) DEVICE — CHLORAPREP HI-LITE 26ML ORANGE

## (undated) DEVICE — GLOVE INDICATOR PI UNDERGLOVE SZ 7.5 BLUE

## (undated) DEVICE — 3M™ STERI-STRIP™ REINFORCED ADHESIVE SKIN CLOSURES, R1546, 1/4 IN X 4 IN (6 MM X 100 MM), 10 STRIPS/ENVELOPE: Brand: 3M™ STERI-STRIP™

## (undated) DEVICE — SCD SEQUENTIAL COMPRESSION COMFORT SLEEVE MEDIUM KNEE LENGTH: Brand: KENDALL SCD

## (undated) DEVICE — LIGHT HANDLE COVER CAMERA DISP

## (undated) DEVICE — ENDOPOUCH RETRIEVER SPECIMEN RETRIEVAL BAGS: Brand: ENDOPOUCH RETRIEVER

## (undated) DEVICE — GAUZE SPONGES,USP TYPE VII GAUZE, 12 PLY: Brand: CURITY

## (undated) DEVICE — GLOVE SRG BIOGEL ECLIPSE 7.5

## (undated) DEVICE — [HIGH FLOW INSUFFLATOR,  DO NOT USE IF PACKAGE IS DAMAGED,  KEEP DRY,  KEEP AWAY FROM SUNLIGHT,  PROTECT FROM HEAT AND RADIOACTIVE SOURCES.]: Brand: PNEUMOSURE

## (undated) DEVICE — 3M™ TEGADERM™ TRANSPARENT FILM DRESSING FRAME STYLE, 1624W, 2-3/8 IN X 2-3/4 IN (6 CM X 7 CM), 100/CT 4CT/CASE: Brand: 3M™ TEGADERM™

## (undated) DEVICE — IRRIG ENDO FLO TUBING

## (undated) DEVICE — AEM CORD

## (undated) DEVICE — CHOLE CATH KIT ARROW

## (undated) DEVICE — ENDOPATH XCEL UNIVERSAL TROCAR STABLILITY SLEEVES: Brand: ENDOPATH XCEL

## (undated) DEVICE — LIGAMAX 5 MM ENDOSCOPIC MULTIPLE CLIP APPLIER: Brand: LIGAMAX

## (undated) DEVICE — ENDOPATH XCEL BLADELESS TROCARS WITH STABILITY SLEEVES: Brand: ENDOPATH XCEL

## (undated) DEVICE — IV CATH 14 G X 1.75

## (undated) DEVICE — INTENDED FOR TISSUE SEPARATION, AND OTHER PROCEDURES THAT REQUIRE A SHARP SURGICAL BLADE TO PUNCTURE OR CUT.: Brand: BARD-PARKER SAFETY BLADES SIZE 15, STERILE

## (undated) DEVICE — REM POLYHESIVE ADULT PATIENT RETURN ELECTRODE: Brand: VALLEYLAB

## (undated) DEVICE — DRAPE C-ARM X-RAY

## (undated) DEVICE — SUT VICRYL 4-0 PS-2 27 IN J426H

## (undated) DEVICE — SYRINGE 10ML LL

## (undated) DEVICE — ALLENTOWN LAP CHOLE APP PACK: Brand: CARDINAL HEALTH